# Patient Record
Sex: MALE | Race: WHITE | Employment: FULL TIME | URBAN - METROPOLITAN AREA
[De-identification: names, ages, dates, MRNs, and addresses within clinical notes are randomized per-mention and may not be internally consistent; named-entity substitution may affect disease eponyms.]

---

## 2019-10-01 ENCOUNTER — APPOINTMENT (OUTPATIENT)
Dept: GENERAL RADIOLOGY | Age: 69
End: 2019-10-01
Attending: EMERGENCY MEDICINE
Payer: COMMERCIAL

## 2019-10-01 ENCOUNTER — HOSPITAL ENCOUNTER (EMERGENCY)
Age: 69
Discharge: HOME | End: 2019-10-01
Attending: EMERGENCY MEDICINE
Payer: COMMERCIAL

## 2019-10-01 VITALS
HEIGHT: 74 IN | HEART RATE: 68 BPM | SYSTOLIC BLOOD PRESSURE: 150 MMHG | OXYGEN SATURATION: 96 % | WEIGHT: 208.34 LBS | DIASTOLIC BLOOD PRESSURE: 86 MMHG | TEMPERATURE: 98.9 F | BODY MASS INDEX: 26.74 KG/M2 | RESPIRATION RATE: 16 BRPM

## 2019-10-01 DIAGNOSIS — R07.9 CHEST PAIN, UNSPECIFIED TYPE: Primary | ICD-10-CM

## 2019-10-01 LAB
ALBUMIN SERPL-MCNC: 4.1 G/DL (ref 3.5–5)
ALBUMIN/GLOB SERPL: 1.2 {RATIO} (ref 1.1–2.2)
ALP SERPL-CCNC: 58 U/L (ref 45–117)
ALT SERPL-CCNC: 22 U/L (ref 12–78)
ANION GAP SERPL CALC-SCNC: 5 MMOL/L (ref 5–15)
AST SERPL-CCNC: 16 U/L (ref 15–37)
BASOPHILS # BLD: 0 K/UL (ref 0–0.1)
BASOPHILS NFR BLD: 1 % (ref 0–1)
BILIRUB SERPL-MCNC: 1 MG/DL (ref 0.2–1)
BUN SERPL-MCNC: 11 MG/DL (ref 6–20)
BUN/CREAT SERPL: 9 (ref 12–20)
CALCIUM SERPL-MCNC: 8.9 MG/DL (ref 8.5–10.1)
CHLORIDE SERPL-SCNC: 101 MMOL/L (ref 97–108)
CK SERPL-CCNC: 75 U/L (ref 39–308)
CO2 SERPL-SCNC: 30 MMOL/L (ref 21–32)
CREAT SERPL-MCNC: 1.19 MG/DL (ref 0.7–1.3)
DIFFERENTIAL METHOD BLD: ABNORMAL
EOSINOPHIL # BLD: 0.1 K/UL (ref 0–0.4)
EOSINOPHIL NFR BLD: 2 % (ref 0–7)
ERYTHROCYTE [DISTWIDTH] IN BLOOD BY AUTOMATED COUNT: 12.5 % (ref 11.5–14.5)
GLOBULIN SER CALC-MCNC: 3.3 G/DL (ref 2–4)
GLUCOSE SERPL-MCNC: 104 MG/DL (ref 65–100)
HCT VFR BLD AUTO: 40.7 % (ref 36.6–50.3)
HGB BLD-MCNC: 14.1 G/DL (ref 12.1–17)
IMM GRANULOCYTES # BLD AUTO: 0 K/UL (ref 0–0.04)
IMM GRANULOCYTES NFR BLD AUTO: 0 % (ref 0–0.5)
LYMPHOCYTES # BLD: 1.1 K/UL (ref 0.8–3.5)
LYMPHOCYTES NFR BLD: 19 % (ref 12–49)
MCH RBC QN AUTO: 34.1 PG (ref 26–34)
MCHC RBC AUTO-ENTMCNC: 34.6 G/DL (ref 30–36.5)
MCV RBC AUTO: 98.3 FL (ref 80–99)
MONOCYTES # BLD: 0.5 K/UL (ref 0–1)
MONOCYTES NFR BLD: 8 % (ref 5–13)
NEUTS SEG # BLD: 4.1 K/UL (ref 1.8–8)
NEUTS SEG NFR BLD: 70 % (ref 32–75)
NRBC # BLD: 0 K/UL (ref 0–0.01)
NRBC BLD-RTO: 0 PER 100 WBC
PLATELET # BLD AUTO: 260 K/UL (ref 150–400)
PMV BLD AUTO: 10.8 FL (ref 8.9–12.9)
POTASSIUM SERPL-SCNC: 3.9 MMOL/L (ref 3.5–5.1)
PROT SERPL-MCNC: 7.4 G/DL (ref 6.4–8.2)
RBC # BLD AUTO: 4.14 M/UL (ref 4.1–5.7)
SODIUM SERPL-SCNC: 136 MMOL/L (ref 136–145)
TROPONIN I BLD-MCNC: <0.04 NG/ML (ref 0–0.08)
TROPONIN I SERPL-MCNC: <0.05 NG/ML
WBC # BLD AUTO: 5.9 K/UL (ref 4.1–11.1)

## 2019-10-01 PROCEDURE — 80053 COMPREHEN METABOLIC PANEL: CPT

## 2019-10-01 PROCEDURE — 85025 COMPLETE CBC W/AUTO DIFF WBC: CPT

## 2019-10-01 PROCEDURE — 74011000250 HC RX REV CODE- 250: Performed by: EMERGENCY MEDICINE

## 2019-10-01 PROCEDURE — 82550 ASSAY OF CK (CPK): CPT

## 2019-10-01 PROCEDURE — 71045 X-RAY EXAM CHEST 1 VIEW: CPT

## 2019-10-01 PROCEDURE — 99284 EMERGENCY DEPT VISIT MOD MDM: CPT

## 2019-10-01 PROCEDURE — 84484 ASSAY OF TROPONIN QUANT: CPT

## 2019-10-01 PROCEDURE — 36415 COLL VENOUS BLD VENIPUNCTURE: CPT

## 2019-10-01 PROCEDURE — 93005 ELECTROCARDIOGRAM TRACING: CPT

## 2019-10-01 PROCEDURE — 74011250637 HC RX REV CODE- 250/637: Performed by: EMERGENCY MEDICINE

## 2019-10-01 PROCEDURE — 99283 EMERGENCY DEPT VISIT LOW MDM: CPT

## 2019-10-01 RX ADMIN — LIDOCAINE HYDROCHLORIDE 40 ML: 20 SOLUTION ORAL; TOPICAL at 19:02

## 2019-10-01 NOTE — DISCHARGE INSTRUCTIONS

## 2019-10-01 NOTE — ED PROVIDER NOTES
EMERGENCY DEPARTMENT HISTORY AND PHYSICAL EXAM      Date: 10/1/2019  Patient Name: Ginger Camarillo. Please note that this dictation was completed with [employer], the computer voice recognition software. Quite often unanticipated grammatical, syntax, homophones, and other interpretive errors are inadvertently transcribed by the computer software. Please disregard these errors. Please excuse any errors that have escaped final proofreading. History of Presenting Illness     Chief Complaint   Patient presents with    Chest Pain     pt reports chest pain since yesterday, was better this morning and then pain returned       History Provided By: Patient    HPI: Ginger Camarillo., 71 y.o. male, cc of chest pain. Reports pain started yesterday in the meeting. Described as burning. Better with walking, worsened by laying flat. He tried some Pepto with minimal relief. This morning his pain was gone, but returned at noon. Same pain as before, described as burning in his chest.  Worse with nothing, relieved by nothing. No associated shortness of breath, nausea, vomiting, diaphoresis. PCP: Alan Diaz MD    No current facility-administered medications on file prior to encounter. Current Outpatient Medications on File Prior to Encounter   Medication Sig Dispense Refill    lisinopril (PRINIVIL, ZESTRIL) 20 mg tablet Take 20 mg by mouth daily.  aspirin delayed-release 81 mg tablet Take  by mouth daily.  fish oil-omega-3 fatty acids 340-1,000 mg capsule Take 1 Cap by mouth daily. Past History     Past Medical History:  Past Medical History:   Diagnosis Date    Hypertension        Past Surgical History:  History reviewed. No pertinent surgical history. Family History:  History reviewed. No pertinent family history. Social History:  Social History     Tobacco Use    Smoking status: Never Smoker    Smokeless tobacco: Never Used   Substance Use Topics    Alcohol use:  Yes Alcohol/week: 11.0 standard drinks     Types: 4 Glasses of wine, 7 Cans of beer per week    Drug use: Not on file       Allergies:  No Known Allergies      Review of Systems   Review of Systems   Constitutional: Negative for chills and fever. HENT: Negative for congestion and sore throat. Eyes: Negative for visual disturbance. Respiratory: Negative for cough and shortness of breath. Cardiovascular: Positive for chest pain. Negative for leg swelling. Gastrointestinal: Negative for abdominal pain, blood in stool, diarrhea and nausea. Endocrine: Negative for polyuria. Genitourinary: Negative for dysuria and testicular pain. Musculoskeletal: Negative for arthralgias, joint swelling and myalgias. Skin: Negative for rash. Allergic/Immunologic: Negative for immunocompromised state. Neurological: Negative for weakness and headaches. Hematological: Does not bruise/bleed easily. Psychiatric/Behavioral: Negative for confusion. Physical Exam   Physical Exam   Constitutional: He is oriented to person, place, and time. He appears well-developed and well-nourished. HENT:   Head: Normocephalic and atraumatic. Moist mucous membranes   Eyes: Pupils are equal, round, and reactive to light. Conjunctivae are normal. Right eye exhibits no discharge. Left eye exhibits no discharge. Neck: Normal range of motion. Neck supple. No tracheal deviation present. Cardiovascular: Normal rate, regular rhythm and normal heart sounds. No murmur heard. Pulmonary/Chest: Effort normal and breath sounds normal. No respiratory distress. He has no wheezes. He has no rales. Abdominal: Soft. Bowel sounds are normal. There is no tenderness. There is no rebound and no guarding. Musculoskeletal: Normal range of motion. He exhibits no edema, tenderness or deformity. Neurological: He is alert and oriented to person, place, and time. Skin: Skin is warm and dry. No rash noted. No erythema.    Psychiatric: His behavior is normal.   Nursing note and vitals reviewed. Diagnostic Study Results     Labs -     No results found for this or any previous visit (from the past 12 hour(s)). Radiologic Studies -   XR CHEST PORT   Final Result   IMPRESSION: No acute cardiopulmonary disease. CT Results  (Last 48 hours)    None        CXR Results  (Last 48 hours)               10/01/19 1813  XR CHEST PORT Final result    Impression:  IMPRESSION: No acute cardiopulmonary disease. Narrative:  INDICATION: Chest pain. Portable AP upright view of the chest.       Direct comparison made to prior chest x-ray dated September 2016. Cardiomediastinal silhouette is stable. Lungs are clear bilaterally. Pleural   spaces are normal. Osseous structures are intact. Medical Decision Making   I am the first provider for this patient. I reviewed the vital signs, available nursing notes, past medical history, past surgical history, family history and social history. Vital Signs-Reviewed the patient's vital signs. No data found. Pulse Oximetry Analysis -99% on room air        EKG interpretation: (Preliminary)  EKG shows sinus rhythm, rate 76. Leftward axis. No evidence of acute ischemic ST or T wave changes. Interpreted by me. Records Reviewed: Nursing Notes and Old Medical Records    Provider Notes (Medical Decision Making):   Patient presents with CP. DDx:  ACS, Aortic dissection, PNA, PE, PTX, pericarditis, myocarditis, GERD, costochondritis, anxiety. Most likely noncardiac given the HPI and Physical exam. Will obtain labs, CXR, EKG. - I have re-examined the patient and the patient denies chest pain on re-examination. The patient has had onset of chest pain greater than 8 hours and one negative set of cardiac enzymes or 2 negative sets of cardiac enzymes in the ER during this visit.   The diagnosis, follow up, return instructions, test results, x-rays and medications have been discussed and reviewed with the patient. The patient has been given the opportunity to ask questions. The patient  expresses understanding of the diagnosis, follow-up and return instructions. The patient agrees to follow up with primary care or cardiology as directed and to return immediately if the chest pain worsens. The patient expresses understanding that although cardiac testing at this time is negative, a cardiac problem could still be present and that a follow-up appointment for further evaluation and risk factor modification is necessary to complete the evaluation of this complaint. ED Course:   Initial assessment performed. The patients presenting problems have been discussed, and they are in agreement with the care plan formulated and outlined with them. I have encouraged them to ask questions as they arise throughout their visit. Critical Care Time:   none    Disposition:  DISCHARGE NOTE  Patients results have been reviewed with them. Patient and/or family have verbally conveyed their understanding and agreement of the patient's signs, symptoms, diagnosis, treatment and prognosis and additionally agree to follow up as recommended or return to the Emergency Room should their condition change or have any new concerns prior to their follow-up appointment. Patient verbally agrees with the care-plan and verbally conveys that all of their questions have been answered. Discharge instructions have also been provided to the patient with some educational information regarding their diagnosis as well a list of reasons why they would want to return to the ER prior to their follow-up appointment should their condition change. PLAN:  1. Discharge Medication List as of 10/1/2019  7:13 PM        2.    Follow-up Information     Follow up With Specialties Details Why Contact Info    Alexandra Nick MD Family Practice Schedule an appointment as soon as possible for a visit  [address] [place] [address] [phone]  [phone]      [facility] EMERGENCY DEPT Emergency Medicine  If symptoms worsen [address] [place] [address]  [phone]          Return to ED if worse     Diagnosis     Clinical Impression:   1. Chest pain, unspecified type        Attestations:   This note was completed by Hanna Hargrove DO

## 2019-10-01 NOTE — ED NOTES
Report received from [facility]. They advised of the patient's chief complaint, current status, orders completed (to include IV access/medications/radiology testing), outstanding orders that still need to be completed, and the treatment plan. Questions asked and answered prior to assumption of care.

## 2019-10-01 NOTE — ED NOTES
Patient states he started with pain in the center of the chest yesterday. States it feels like a burning in the center of the chest, states better with walking, no other associated symptoms.

## 2019-10-02 LAB
ATRIAL RATE: 76 BPM
CALCULATED P AXIS, ECG09: 52 DEGREES
CALCULATED R AXIS, ECG10: -10 DEGREES
CALCULATED T AXIS, ECG11: 32 DEGREES
DIAGNOSIS, 93000: NORMAL
P-R INTERVAL, ECG05: 164 MS
Q-T INTERVAL, ECG07: 388 MS
QRS DURATION, ECG06: 98 MS
QTC CALCULATION (BEZET), ECG08: 436 MS
VENTRICULAR RATE, ECG03: 76 BPM

## 2021-04-02 ENCOUNTER — TRANSCRIBE ORDER (OUTPATIENT)
Dept: REGISTRATION | Age: 71
End: 2021-04-02

## 2021-04-02 ENCOUNTER — HOSPITAL ENCOUNTER (OUTPATIENT)
Dept: NON INVASIVE DIAGNOSTICS | Age: 71
Discharge: HOME | End: 2021-04-02
Payer: COMMERCIAL

## 2021-04-02 DIAGNOSIS — Z41.9 ELECTIVE SURGERY: Primary | ICD-10-CM

## 2021-04-02 DIAGNOSIS — Z41.9 ELECTIVE SURGERY: ICD-10-CM

## 2021-04-02 LAB
ATRIAL RATE: 68 BPM
CALCULATED P AXIS, ECG09: 71 DEGREES
CALCULATED R AXIS, ECG10: 0 DEGREES
CALCULATED T AXIS, ECG11: 33 DEGREES
DIAGNOSIS, 93000: NORMAL
P-R INTERVAL, ECG05: 174 MS
Q-T INTERVAL, ECG07: 386 MS
QRS DURATION, ECG06: 106 MS
QTC CALCULATION (BEZET), ECG08: 410 MS
VENTRICULAR RATE, ECG03: 68 BPM

## 2021-04-02 PROCEDURE — 93005 ELECTROCARDIOGRAM TRACING: CPT

## 2022-12-08 ENCOUNTER — TRANSCRIBE ORDER (OUTPATIENT)
Dept: SCHEDULING | Age: 72
End: 2022-12-08

## 2022-12-08 DIAGNOSIS — D47.2 MONOCLONAL GAMMOPATHIES: ICD-10-CM

## 2022-12-08 DIAGNOSIS — R23.3 SPONTANEOUS ECCHYMOSES: Primary | ICD-10-CM

## 2022-12-12 ENCOUNTER — HOSPITAL ENCOUNTER (OUTPATIENT)
Dept: GENERAL RADIOLOGY | Age: 72
Discharge: HOME | End: 2022-12-12
Attending: INTERNAL MEDICINE
Payer: COMMERCIAL

## 2022-12-12 DIAGNOSIS — R23.3 SPONTANEOUS ECCHYMOSES: ICD-10-CM

## 2022-12-12 DIAGNOSIS — D47.2 MONOCLONAL GAMMOPATHIES: ICD-10-CM

## 2022-12-12 PROCEDURE — 77075 RADEX OSSEOUS SURVEY COMPL: CPT

## 2023-10-20 ENCOUNTER — TELEPHONE (OUTPATIENT)
Age: 73
End: 2023-10-20

## 2023-10-20 NOTE — TELEPHONE ENCOUNTER
Returned call to wife Buddie Favorite, verified with two pt identifiers, advised of sooner availability for appt. Scheduled appt for 10/31/23 at 8 am with . she verbalized understanding and thanked me for the call.

## 2023-10-20 NOTE — TELEPHONE ENCOUNTER
Called and left message for pt to call to reschedule his appt. We are going to move it up to an earlier appt.

## 2023-10-20 NOTE — TELEPHONE ENCOUNTER
Patients wife returning your call regarding a sooner apptmt with Dr. Emily Casey.  Please call [phone]

## 2023-10-26 NOTE — PROGRESS NOTES
Neurology Note    Patient ID:  Irene Elam  [id number]  68 y.o.  1950      Date of Consultation:  October 31, 2023    Referring Physician: Dr. Octavia Mast    Reason for Consultation:  memory loss      Assessment and Plan:    The patient is a pleasant 77-year-old with a 1+ year history of of tremor and cognitive concern. His examination is notable for parkinsonism and mild cognitive impairment. Resting tremor, masked facies, stooped posture:  Symptoms are consistent with Parkinson's disease  Differential includes idiopathic versus secondary parkinsonism  I will order a brain MRI to evaluate for structural abnormalities  I did discuss medication with him including carbidopa/levodopa and dopamine agonist.  He is not interested in starting medication at this time as he would like to look into nonpharmacological treatments first.    We did discuss at length the diagnosis of Parkinson's disease and things that the patient could be doing to help slow the disease process. We did talk about the importance of exercise and movement and how this can help improve quality of life in patients with Parkinson's disease. We also did discuss the importance of healthy diet and diets similar to a Mediterranean diet can potentially have it in the impact in a positive way of Parkinson's disease. I did provide him with information on this diet    He will keep me updated on how his symptoms progress and if there is worsening and the need for medication. Memory loss:   The patient has noticed slow progressive decline in cognitive impairment  Differential includes early onset Alzheimer's, Parkinson's disease/Alzheimer disease complex  Brain MRI ordered  The patient will follow-up with his primary care doctor to ensure that his thyroid and vitamin B12 level were checked  I will have the patient be scheduled for neuropsychological testing to determine the more detailed etiology of the cognitive impairment and best strategies to

## 2023-10-31 ENCOUNTER — OFFICE VISIT (OUTPATIENT)
Age: 73
End: 2023-10-31
Payer: COMMERCIAL

## 2023-10-31 VITALS
SYSTOLIC BLOOD PRESSURE: 132 MMHG | HEIGHT: 74 IN | DIASTOLIC BLOOD PRESSURE: 84 MMHG | OXYGEN SATURATION: 100 % | WEIGHT: 210.4 LBS | TEMPERATURE: 97.5 F | HEART RATE: 67 BPM | RESPIRATION RATE: 18 BRPM | BODY MASS INDEX: 27 KG/M2

## 2023-10-31 DIAGNOSIS — G20.A1 PARKINSON'S DISEASE WITHOUT DYSKINESIA, UNSPECIFIED WHETHER MANIFESTATIONS FLUCTUATE: Primary | ICD-10-CM

## 2023-10-31 DIAGNOSIS — G31.84 MCI (MILD COGNITIVE IMPAIRMENT): ICD-10-CM

## 2023-10-31 DIAGNOSIS — R41.3 MEMORY LOSS: ICD-10-CM

## 2023-10-31 DIAGNOSIS — R25.1 TREMOR: ICD-10-CM

## 2023-10-31 PROCEDURE — 99205 OFFICE O/P NEW HI 60 MIN: CPT | Performed by: PSYCHIATRY & NEUROLOGY

## 2023-10-31 PROCEDURE — 1123F ACP DISCUSS/DSCN MKR DOCD: CPT | Performed by: PSYCHIATRY & NEUROLOGY

## 2023-10-31 RX ORDER — MIRTAZAPINE 7.5 MG/1
7.5 TABLET, FILM COATED ORAL NIGHTLY
COMMUNITY
Start: 2022-06-27

## 2023-10-31 RX ORDER — TELMISARTAN AND HYDROCHLORTHIAZIDE 80; 12.5 MG/1; MG/1
92.5 TABLET ORAL NIGHTLY
COMMUNITY
Start: 2022-06-03

## 2023-10-31 RX ORDER — SILODOSIN 8 MG/1
8 CAPSULE ORAL NIGHTLY
COMMUNITY
Start: 2022-06-26

## 2023-10-31 RX ORDER — AMOXICILLIN 250 MG
1 CAPSULE ORAL PRN
COMMUNITY

## 2023-10-31 RX ORDER — ESOMEPRAZOLE MAGNESIUM 40 MG/1
40 CAPSULE, DELAYED RELEASE ORAL DAILY
COMMUNITY
Start: 2022-07-19

## 2023-10-31 RX ORDER — DUTASTERIDE 0.5 MG/1
0.5 CAPSULE, LIQUID FILLED ORAL DAILY
COMMUNITY

## 2023-10-31 ASSESSMENT — PATIENT HEALTH QUESTIONNAIRE - PHQ9
SUM OF ALL RESPONSES TO PHQ9 QUESTIONS 1 & 2: 0
SUM OF ALL RESPONSES TO PHQ QUESTIONS 1-9: 0
2. FEELING DOWN, DEPRESSED OR HOPELESS: 0
1. LITTLE INTEREST OR PLEASURE IN DOING THINGS: 0

## 2023-11-09 ENCOUNTER — HOSPITAL ENCOUNTER (OUTPATIENT)
Facility: HOSPITAL | Age: 73
Discharge: HOME | End: 2023-11-09
Attending: PSYCHIATRY & NEUROLOGY
Payer: COMMERCIAL

## 2023-11-09 DIAGNOSIS — R41.3 MEMORY LOSS: ICD-10-CM

## 2023-11-09 DIAGNOSIS — G20.A1 PARKINSON'S DISEASE WITHOUT DYSKINESIA, UNSPECIFIED WHETHER MANIFESTATIONS FLUCTUATE: ICD-10-CM

## 2023-11-09 PROCEDURE — 70551 MRI BRAIN STEM W/O DYE: CPT

## 2023-11-17 ENCOUNTER — TELEPHONE (OUTPATIENT)
Age: 73
End: 2023-11-17

## 2023-11-17 NOTE — TELEPHONE ENCOUNTER
Patient request to be placed on the wait list with Dr. Villagomez. Please call him at [phone]

## 2024-02-16 ENCOUNTER — HOSPITAL ENCOUNTER (OUTPATIENT)
Facility: HOSPITAL | Age: 74
Discharge: HOME | End: 2024-02-16
Attending: INTERNAL MEDICINE
Payer: COMMERCIAL

## 2024-02-16 DIAGNOSIS — D47.2 MONOCLONAL PARAPROTEINEMIA: ICD-10-CM

## 2024-02-16 DIAGNOSIS — R23.3: ICD-10-CM

## 2024-02-16 PROCEDURE — 77075 RADEX OSSEOUS SURVEY COMPL: CPT

## 2024-04-15 ENCOUNTER — OFFICE VISIT (OUTPATIENT)
Age: 74
End: 2024-04-15
Payer: COMMERCIAL

## 2024-04-15 DIAGNOSIS — G20.A1 PARKINSON'S DISEASE WITHOUT DYSKINESIA, UNSPECIFIED WHETHER MANIFESTATIONS FLUCTUATE (HCC): Primary | ICD-10-CM

## 2024-04-15 DIAGNOSIS — G31.84 MCI (MILD COGNITIVE IMPAIRMENT): ICD-10-CM

## 2024-04-15 PROCEDURE — 90791 PSYCH DIAGNOSTIC EVALUATION: CPT | Performed by: CLINICAL NEUROPSYCHOLOGIST

## 2024-04-15 PROCEDURE — 1123F ACP DISCUSS/DSCN MKR DOCD: CPT | Performed by: CLINICAL NEUROPSYCHOLOGIST

## 2024-04-15 NOTE — PROGRESS NOTES
Intake Note      Patient Name: Theron Sterling Jr.  YOB: 1950    Age: 74 y.o.  Date of Intake: 4/15/2024   Education: 16 Ethnicity White   Gender: Male Referring Provider: Dr. Chisholm     REASON FOR REFERRAL AND EVALUATION PROCEDURES:  Theron Sterling Jr.  was referred for evaluation by his Neurologist to assist in differential diagnosis and individualized treatment planning. he understood the rationale and procedures for evaluation, as well as the limits to confidentiality, and agreed to participate. he consented to have this report made available to his  treating providers through his  electronic medical records.   History Sources: Patient, Spouse, and Medical Record    HISTORY OF PRESENT ILLNESS:  The patient is a 74-year-old male with pertinent medical history noted for hypertension.  He was referred by Dr. Chisholm due to concerns regarding cognitive dysfunction that is occurring within the context of physical changes concerning for Parkinson's disease (e.g., bilateral upper extremity tremor, masked facies, and stooped posture).  The patient presented to the current clinical interview accompanied by his wife.  While he appeared capable of providing history, his wife assisted with providing specific details in clarifying his report.  The patient initially reported he has not noticed any significant changes in his cognitive functioning.  However, with more specific questioning, he acknowledged there are inefficiencies in retrieving episodic information as well as less rapid word finding.  His wife agreed with this report and stated there has been \"just a little bit of a change\" that has occurred in the past year.  She denied noticing acute fluctuations in the patient's mental status and both the patient and his wife reported he remains functionally independent in all ADLs and IADLs.    Pertaining to the patient's physical functioning, his wife started noticing the emergence of a right upper extremity tremor a

## 2024-04-16 ENCOUNTER — TELEPHONE (OUTPATIENT)
Age: 74
End: 2024-04-16

## 2024-04-16 NOTE — TELEPHONE ENCOUNTER
Contacted [employer], per Sonali BRUNSON no auth is needed for [id number], [id number], [id number], [id number], [id number].  REF #: [id number]  Timestamp: [id number]

## 2024-04-30 ENCOUNTER — PROCEDURE VISIT (OUTPATIENT)
Age: 74
End: 2024-04-30
Payer: COMMERCIAL

## 2024-04-30 DIAGNOSIS — G20.A1 PARKINSON'S DISEASE WITHOUT DYSKINESIA, UNSPECIFIED WHETHER MANIFESTATIONS FLUCTUATE (HCC): Primary | ICD-10-CM

## 2024-04-30 DIAGNOSIS — G31.84 MCI (MILD COGNITIVE IMPAIRMENT): ICD-10-CM

## 2024-04-30 PROCEDURE — 96132 NRPSYC TST EVAL PHYS/QHP 1ST: CPT | Performed by: CLINICAL NEUROPSYCHOLOGIST

## 2024-04-30 PROCEDURE — 96138 PSYCL/NRPSYC TECH 1ST: CPT | Performed by: CLINICAL NEUROPSYCHOLOGIST

## 2024-04-30 PROCEDURE — 96139 PSYCL/NRPSYC TST TECH EA: CPT | Performed by: CLINICAL NEUROPSYCHOLOGIST

## 2024-04-30 PROCEDURE — 96133 NRPSYC TST EVAL PHYS/QHP EA: CPT | Performed by: CLINICAL NEUROPSYCHOLOGIST

## 2024-05-15 ENCOUNTER — OFFICE VISIT (OUTPATIENT)
Age: 74
End: 2024-05-15
Payer: COMMERCIAL

## 2024-05-15 DIAGNOSIS — G20.A1 PARKINSON'S DISEASE WITHOUT DYSKINESIA, UNSPECIFIED WHETHER MANIFESTATIONS FLUCTUATE (HCC): ICD-10-CM

## 2024-05-15 DIAGNOSIS — G31.84 MCI (MILD COGNITIVE IMPAIRMENT): Primary | ICD-10-CM

## 2024-05-15 PROCEDURE — 96132 NRPSYC TST EVAL PHYS/QHP 1ST: CPT | Performed by: CLINICAL NEUROPSYCHOLOGIST

## 2024-05-15 NOTE — PROGRESS NOTES
Prior to seeing the patient for today's visit, I reviewed pertinent records, including the previously completed report, the records in Epic, and any updated visits from other providers since the patient's last visit.    I provided feedback services related to the previously completed report. Attendees included: Patient, Spouse, and Children. Education was provided regarding my diagnostic impressions, and we discussed treatment plan/options. Attendees were provided with the opportunity to ask questions, which were answered to the best of my ability.    We discussed, in detail, the following:  Reviewed findings from evaluation including test results, diagnosis, and suspected contributing factors  Discussed recommendations outlined in report  Answered questions to the best of my ability    The patient needs to:   Follow up with referring provider for ongoing management, Complete driving evaluation, Use practical strategies to compensate for cognitive weaknesses (See handout), Emphasize modifiable risk and protective factors for cognitive functioning (e.g., exercise, diet, cognitive stimulation; see handout), Access community resources we discussed for additional support (See handout), and Follow up in 12 months    The patient had the following reactions to recommendations: Patient and family reported understanding results and recommendations.  They were interested in medication for cognitive functioning and physical symptoms and I deferred these questions to Dr. Chisholm.  They were open to follow-up and driving simulator so we will place that order and schedule for 12 months    MENTAL STATUS:  Appearance: Casually dressed, Well-groomed, and Appeared stated age  Orientation: Person, Place, Time, and Situation  Motor: Masked facies, reduced arm swing, hunched posture, and right upper extremity resting tremor  Thought Processes: Clear, Coherent, Logical, and Organized  Hearing and Vision: Adequate  Speech: Appropriate for

## 2024-06-18 ENCOUNTER — TELEPHONE (OUTPATIENT)
Age: 74
End: 2024-06-18

## 2024-06-18 NOTE — TELEPHONE ENCOUNTER
Patient called he would like to discuss his driving test results and when he can start back driving. Please call him at [phone]

## 2024-06-18 NOTE — TELEPHONE ENCOUNTER
Called and spoke with patient. Informed per provider he should follow the recommendations and restrictions provided to him by [facility]. Patient expressed understanding and thanked me for calling.

## 2024-09-11 ENCOUNTER — OFFICE VISIT (OUTPATIENT)
Age: 74
End: 2024-09-11
Payer: COMMERCIAL

## 2024-09-11 VITALS
RESPIRATION RATE: 15 BRPM | DIASTOLIC BLOOD PRESSURE: 84 MMHG | OXYGEN SATURATION: 98 % | BODY MASS INDEX: 24.38 KG/M2 | HEART RATE: 80 BPM | SYSTOLIC BLOOD PRESSURE: 124 MMHG | HEIGHT: 74 IN | WEIGHT: 190 LBS

## 2024-09-11 DIAGNOSIS — G31.84 MCI (MILD COGNITIVE IMPAIRMENT): ICD-10-CM

## 2024-09-11 DIAGNOSIS — G20.A1 PARKINSON'S DISEASE WITHOUT DYSKINESIA, UNSPECIFIED WHETHER MANIFESTATIONS FLUCTUATE (HCC): Primary | ICD-10-CM

## 2024-09-11 PROCEDURE — 1123F ACP DISCUSS/DSCN MKR DOCD: CPT | Performed by: PSYCHIATRY & NEUROLOGY

## 2024-09-11 PROCEDURE — 99214 OFFICE O/P EST MOD 30 MIN: CPT | Performed by: PSYCHIATRY & NEUROLOGY

## 2024-09-11 RX ORDER — PSEUDOEPHEDRINE HCL 30 MG
100 TABLET ORAL AS NEEDED
COMMUNITY
Start: 2022-05-04

## 2024-09-11 RX ORDER — CARBIDOPA AND LEVODOPA 25; 100 MG/1; MG/1
1 TABLET ORAL 2 TIMES DAILY
Qty: 180 TABLET | Refills: 3 | Status: SHIPPED | OUTPATIENT
Start: 2024-09-11

## 2024-09-11 ASSESSMENT — PATIENT HEALTH QUESTIONNAIRE - PHQ9
2. FEELING DOWN, DEPRESSED OR HOPELESS: NOT AT ALL
1. LITTLE INTEREST OR PLEASURE IN DOING THINGS: NOT AT ALL
SUM OF ALL RESPONSES TO PHQ QUESTIONS 1-9: 0
SUM OF ALL RESPONSES TO PHQ QUESTIONS 1-9: 0
SUM OF ALL RESPONSES TO PHQ9 QUESTIONS 1 & 2: 0
SUM OF ALL RESPONSES TO PHQ QUESTIONS 1-9: 0
SUM OF ALL RESPONSES TO PHQ QUESTIONS 1-9: 0

## 2025-04-21 ENCOUNTER — TELEPHONE (OUTPATIENT)
Age: 75
End: 2025-04-21

## 2025-04-21 ENCOUNTER — OFFICE VISIT (OUTPATIENT)
Age: 75
End: 2025-04-21
Payer: COMMERCIAL

## 2025-04-21 DIAGNOSIS — G31.84 MCI (MILD COGNITIVE IMPAIRMENT): ICD-10-CM

## 2025-04-21 DIAGNOSIS — G20.A1 PARKINSON'S DISEASE WITHOUT DYSKINESIA, UNSPECIFIED WHETHER MANIFESTATIONS FLUCTUATE (HCC): Primary | ICD-10-CM

## 2025-04-21 PROCEDURE — 1123F ACP DISCUSS/DSCN MKR DOCD: CPT | Performed by: CLINICAL NEUROPSYCHOLOGIST

## 2025-04-21 PROCEDURE — 90791 PSYCH DIAGNOSTIC EVALUATION: CPT | Performed by: CLINICAL NEUROPSYCHOLOGIST

## 2025-04-21 NOTE — TELEPHONE ENCOUNTER
Contacted [employer], per Aj CHUNG prior authorization is not required for procedure codes 42204, 34559, 89371, 53129, 32632.  Reference # [id number]

## 2025-04-21 NOTE — PROGRESS NOTES
Intake Note      Patient Name: Theron Sterling Jr.  YOB: 1950    Age: 75 y.o.  Date of Intake: 4/21/2025   Education: 16 Ethnicity White   Gender: Male Referring Provider: Dr. Chisholm     REASON FOR REFERRAL AND EVALUATION PROCEDURES:  Theron Sterling Jr.  was referred for evaluation by his Neurologist to assist in differential diagnosis and individualized treatment planning. he understood the rationale and procedures for evaluation, as well as the limits to confidentiality, and agreed to participate. he consented to have this report made available to his  treating providers through his  electronic medical records.   History Sources: Patient, Spouse, and Medical Record    HISTORY OF PRESENT ILLNESS:  The patient is a 75-year-old male with pertinent medical history noted for hypertension and Parkinson's disease.  He presented for serial neuropsychological evaluation following workup in our office in April 2024.  From my previous note:    The patient presented to the current clinical interview accompanied by his wife.  While he appeared capable of providing history, his wife assisted with providing specific details in clarifying his report.  The patient initially reported he has not noticed any significant changes in his cognitive functioning.  However, with more specific questioning, he acknowledged there are inefficiencies in retrieving episodic information as well as less rapid word finding.  His wife agreed with this report and stated there has been \"just a little bit of a change\" that has occurred in the past year.  She denied noticing acute fluctuations in the patient's mental status and both the patient and his wife reported he remains functionally independent in all ADLs and IADLs.     Pertaining to the patient's physical functioning, his wife started noticing the emergence of a right upper extremity tremor a few years ago that has worsened more so in the past year.  The patient also reported the

## 2025-05-05 ENCOUNTER — PROCEDURE VISIT (OUTPATIENT)
Age: 75
End: 2025-05-05
Payer: COMMERCIAL

## 2025-05-05 DIAGNOSIS — F02.A4 MILD DEMENTIA DUE TO PARKINSON'S DISEASE, WITH ANXIETY (HCC): Primary | ICD-10-CM

## 2025-05-05 DIAGNOSIS — G20.A1 MILD DEMENTIA DUE TO PARKINSON'S DISEASE, WITH ANXIETY (HCC): Primary | ICD-10-CM

## 2025-05-05 PROCEDURE — 96138 PSYCL/NRPSYC TECH 1ST: CPT | Performed by: CLINICAL NEUROPSYCHOLOGIST

## 2025-05-05 PROCEDURE — 96132 NRPSYC TST EVAL PHYS/QHP 1ST: CPT | Performed by: CLINICAL NEUROPSYCHOLOGIST

## 2025-05-05 PROCEDURE — 96139 PSYCL/NRPSYC TST TECH EA: CPT | Performed by: CLINICAL NEUROPSYCHOLOGIST

## 2025-05-05 PROCEDURE — 96133 NRPSYC TST EVAL PHYS/QHP EA: CPT | Performed by: CLINICAL NEUROPSYCHOLOGIST

## 2025-05-19 PROBLEM — G20.A1: Status: ACTIVE | Noted: 2025-05-19

## 2025-05-19 PROBLEM — F02.A4: Status: ACTIVE | Noted: 2025-05-19

## 2025-05-19 NOTE — PROGRESS NOTES
as qualitative observations of significant clinical change. A change was deemed to be statistically reliable if the z-score surpassed +/- 1.64 (90% confidence that change reflects a real change when accounting for error and practice effect). RCI data matching key demographic variables (e.g., age, ethnicity, gender, education, test-retest interval, etc.) may not be available and as such RCI is used more as a guide rather than a definitive indicator of change.    Premorbid Functioning:   Average (based on previous evaluation)  Global Cognitive Functioning (MoCA):  Visuospatial/executive: 1/5; earning point(s) for clock contour  Naming: 3/3  Memory (learning):  First trial: 3/5  Second trial: 4/5  Attention: 1/6; earning point(s) for 1/3 for serial seven subtraction  Language: 1/3; earning point(s) for first sentence repetition  Abstraction: 1/2; earning point(s) for methods of measurement  Memory (delayed recall):  Free recall: 2/5  Category cue: 2/3  Multiple-choice: 0/1  Orientation: 4/6; earning point(s) for month, year, day, and city  Total: 13/30 + 1 point(s) for education correction = 13/30  Attention:   Brief auditory attention: Average; unchanged  Auditory working memory:   Digit span backward: Low average; unchanged  Digit span sequencing: Exceptionally low; below the previous evaluation  Together, these performances are statistically below the previous evaluation  Processing Speed:  Below average to low average but generally in the low average range.  Largely unchanged.  Executive Functioning:   Mental set switching: Discontinued due to time limits.  During the allotted time (5 minutes), the patient successfully completed 13 out of 24 transitions.  He made three sequencing errors.  This is a similar performance to last year.  Problem Solving: Exceptionally low (completed zero categories).  This is clinically below the previous evaluation during which he completed four categories  Inhibition: Discontinued

## 2025-05-20 ENCOUNTER — OFFICE VISIT (OUTPATIENT)
Age: 75
End: 2025-05-20
Payer: COMMERCIAL

## 2025-05-20 DIAGNOSIS — G20.A1 MILD DEMENTIA DUE TO PARKINSON'S DISEASE, WITH ANXIETY (HCC): Primary | ICD-10-CM

## 2025-05-20 DIAGNOSIS — F02.A4 MILD DEMENTIA DUE TO PARKINSON'S DISEASE, WITH ANXIETY (HCC): Primary | ICD-10-CM

## 2025-05-20 PROCEDURE — 96132 NRPSYC TST EVAL PHYS/QHP 1ST: CPT | Performed by: CLINICAL NEUROPSYCHOLOGIST

## 2025-06-11 NOTE — PROGRESS NOTES
Neurology Note    Patient ID:  Theron Sterling Jr.  [id number]  75 y.o.  1950      Date of Consultation:  June 16, 2025    Assessment and Plan:    The patient is a pleasant male with parkinsonism and mild cognitive impairment who returns to the neurology clinic for follow up visit    Resting tremor, masked facies, stooped posture - parkinson's disease  progressive  Brain mri revealed chronic microvascular ischemic changes  Positive response to low dose sinemet.  Will increase to three times a day with a second pill at the mid day dosing.  Will continue with  25/100 mg tablets.   Discussed starting speech therapy.  He was not interested at this time.   We did discuss at length the diagnosis of Parkinson's disease and things that the patient could be doing to help slow the disease process.  We did talk about the importance of exercise and movement and how this can help improve quality of life in patients with Parkinson's disease.  We also did discuss the importance of healthy diet and diets similar to a Mediterranean diet can potentially have it in the impact in a positive way of Parkinson's disease.  I did provide him with information on this diet.  I did answer all the questions they had today in regards to research.  He will keep me updated on how his symptoms progress and if there is worsening and the need for medication adjustments.    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  Will start aricept 5 mg a day.  Side effects and toxicity were discussed  Discussed the importance of cognitively stimulating exercises daily  I discussed with the patient and family members in regards to the patient's cognitive limitations and need to ensure patient safety.  Attempts to minimize opportunities of harm is important.   Activities to be monitored, or avoided, would include cooking, ironing clothes, financial bill payments.   Having structure to a day is important for the patient.

## 2025-06-16 ENCOUNTER — OFFICE VISIT (OUTPATIENT)
Age: 75
End: 2025-06-16
Payer: COMMERCIAL

## 2025-06-16 VITALS
OXYGEN SATURATION: 97 % | DIASTOLIC BLOOD PRESSURE: 62 MMHG | HEIGHT: 74 IN | WEIGHT: 189 LBS | RESPIRATION RATE: 15 BRPM | SYSTOLIC BLOOD PRESSURE: 114 MMHG | BODY MASS INDEX: 24.26 KG/M2 | HEART RATE: 73 BPM

## 2025-06-16 DIAGNOSIS — F02.A4 MILD DEMENTIA DUE TO PARKINSON'S DISEASE, WITH ANXIETY (HCC): ICD-10-CM

## 2025-06-16 DIAGNOSIS — R41.3 MEMORY LOSS: ICD-10-CM

## 2025-06-16 DIAGNOSIS — G20.A1 PARKINSON'S DISEASE WITHOUT DYSKINESIA, UNSPECIFIED WHETHER MANIFESTATIONS FLUCTUATE (HCC): Primary | ICD-10-CM

## 2025-06-16 DIAGNOSIS — G20.A1 MILD DEMENTIA DUE TO PARKINSON'S DISEASE, WITH ANXIETY (HCC): ICD-10-CM

## 2025-06-16 PROCEDURE — 1123F ACP DISCUSS/DSCN MKR DOCD: CPT | Performed by: PSYCHIATRY & NEUROLOGY

## 2025-06-16 PROCEDURE — 99214 OFFICE O/P EST MOD 30 MIN: CPT | Performed by: PSYCHIATRY & NEUROLOGY

## 2025-06-16 RX ORDER — CARBIDOPA AND LEVODOPA 25; 100 MG/1; MG/1
1 TABLET ORAL 4 TIMES DAILY
Qty: 360 TABLET | Refills: 3 | Status: ON HOLD | OUTPATIENT
Start: 2025-06-16

## 2025-06-16 RX ORDER — DONEPEZIL HYDROCHLORIDE 5 MG/1
5 TABLET, FILM COATED ORAL NIGHTLY
Qty: 30 TABLET | Refills: 11 | Status: ON HOLD | OUTPATIENT
Start: 2025-06-16

## 2025-06-16 ASSESSMENT — PATIENT HEALTH QUESTIONNAIRE - PHQ9
SUM OF ALL RESPONSES TO PHQ QUESTIONS 1-9: 0
1. LITTLE INTEREST OR PLEASURE IN DOING THINGS: NOT AT ALL
2. FEELING DOWN, DEPRESSED OR HOPELESS: NOT AT ALL
SUM OF ALL RESPONSES TO PHQ QUESTIONS 1-9: 0

## 2025-06-16 NOTE — PROGRESS NOTES
1. Have you been to the ER, urgent care clinic since your last visit?  Hospitalized since your last visit?  No.    2. Have you seen or consulted any other health care providers outside of the [facility] since your last visit?  Include any pap smears or colon screening.   No.    Chief Complaint   Patient presents with    Parkinson's Disease      Pt denies any symptoms

## 2025-06-19 ENCOUNTER — ANESTHESIA EVENT (OUTPATIENT)
Facility: HOSPITAL | Age: 75
End: 2025-06-19
Payer: COMMERCIAL

## 2025-06-19 ENCOUNTER — ANESTHESIA (OUTPATIENT)
Facility: HOSPITAL | Age: 75
End: 2025-06-19
Payer: COMMERCIAL

## 2025-06-19 ENCOUNTER — APPOINTMENT (OUTPATIENT)
Facility: HOSPITAL | Age: 75
End: 2025-06-19
Payer: COMMERCIAL

## 2025-06-19 ENCOUNTER — HOSPITAL ENCOUNTER (INPATIENT)
Facility: HOSPITAL | Age: 75
LOS: 8 days | Discharge: HOME HEALTH | End: 2025-06-27
Attending: STUDENT IN AN ORGANIZED HEALTH CARE EDUCATION/TRAINING PROGRAM | Admitting: INTERNAL MEDICINE
Payer: COMMERCIAL

## 2025-06-19 DIAGNOSIS — E87.6 HYPOKALEMIA: ICD-10-CM

## 2025-06-19 DIAGNOSIS — K56.2 SIGMOID VOLVULUS (HCC): Primary | ICD-10-CM

## 2025-06-19 DIAGNOSIS — E87.1 HYPONATREMIA: ICD-10-CM

## 2025-06-19 LAB
ALBUMIN SERPL-MCNC: 4.2 G/DL (ref 3.5–5)
ALBUMIN/GLOB SERPL: 1.5 (ref 1.1–2.2)
ALP SERPL-CCNC: 70 U/L (ref 45–117)
ALT SERPL-CCNC: 10 U/L (ref 12–78)
ANION GAP SERPL CALC-SCNC: 7 MMOL/L (ref 2–12)
APPEARANCE UR: CLEAR
AST SERPL-CCNC: 16 U/L (ref 15–37)
BACTERIA URNS QL MICRO: NEGATIVE /HPF
BASOPHILS # BLD: 0.02 K/UL (ref 0–0.1)
BASOPHILS NFR BLD: 0.3 % (ref 0–1)
BILIRUB SERPL-MCNC: 0.9 MG/DL (ref 0.2–1)
BILIRUB UR QL: NEGATIVE
BUN SERPL-MCNC: 17 MG/DL (ref 6–20)
BUN/CREAT SERPL: 13 (ref 12–20)
CALCIUM SERPL-MCNC: 9.2 MG/DL (ref 8.5–10.1)
CHLORIDE SERPL-SCNC: 89 MMOL/L (ref 97–108)
CO2 SERPL-SCNC: 28 MMOL/L (ref 21–32)
COLOR UR: ABNORMAL
CREAT SERPL-MCNC: 1.26 MG/DL (ref 0.7–1.3)
DIFFERENTIAL METHOD BLD: ABNORMAL
EKG ATRIAL RATE: 79 BPM
EKG DIAGNOSIS: NORMAL
EKG P AXIS: 57 DEGREES
EKG P-R INTERVAL: 176 MS
EKG Q-T INTERVAL: 402 MS
EKG QRS DURATION: 102 MS
EKG QTC CALCULATION (BAZETT): 460 MS
EKG R AXIS: -10 DEGREES
EKG T AXIS: 28 DEGREES
EKG VENTRICULAR RATE: 79 BPM
EOSINOPHIL # BLD: 0.13 K/UL (ref 0–0.4)
EOSINOPHIL NFR BLD: 2.2 % (ref 0–7)
EPITH CASTS URNS QL MICRO: ABNORMAL /LPF
ERYTHROCYTE [DISTWIDTH] IN BLOOD BY AUTOMATED COUNT: 12.3 % (ref 11.5–14.5)
GLOBULIN SER CALC-MCNC: 2.8 G/DL (ref 2–4)
GLUCOSE SERPL-MCNC: 119 MG/DL (ref 65–100)
GLUCOSE UR STRIP.AUTO-MCNC: NEGATIVE MG/DL
HCT VFR BLD AUTO: 32.4 % (ref 36.6–50.3)
HGB BLD-MCNC: 11.4 G/DL (ref 12.1–17)
HGB UR QL STRIP: NEGATIVE
IMM GRANULOCYTES # BLD AUTO: 0.03 K/UL (ref 0–0.04)
IMM GRANULOCYTES NFR BLD AUTO: 0.5 % (ref 0–0.5)
KETONES UR QL STRIP.AUTO: 15 MG/DL
LEUKOCYTE ESTERASE UR QL STRIP.AUTO: NEGATIVE
LIPASE SERPL-CCNC: 19 U/L (ref 13–75)
LYMPHOCYTES # BLD: 0.5 K/UL (ref 0.8–3.5)
LYMPHOCYTES NFR BLD: 8.5 % (ref 12–49)
MAGNESIUM SERPL-MCNC: 1.4 MG/DL (ref 1.6–2.4)
MCH RBC QN AUTO: 32.5 PG (ref 26–34)
MCHC RBC AUTO-ENTMCNC: 35.2 G/DL (ref 30–36.5)
MCV RBC AUTO: 92.3 FL (ref 80–99)
MONOCYTES # BLD: 0.45 K/UL (ref 0–1)
MONOCYTES NFR BLD: 7.7 % (ref 5–13)
NEUTS SEG # BLD: 4.73 K/UL (ref 1.8–8)
NEUTS SEG NFR BLD: 80.8 % (ref 32–75)
NITRITE UR QL STRIP.AUTO: NEGATIVE
NRBC # BLD: 0 K/UL (ref 0–0.01)
NRBC BLD-RTO: 0 PER 100 WBC
PH UR STRIP: 6 (ref 5–8)
PLATELET # BLD AUTO: 226 K/UL (ref 150–400)
PMV BLD AUTO: 11.3 FL (ref 8.9–12.9)
POTASSIUM SERPL-SCNC: 2.7 MMOL/L (ref 3.5–5.1)
PROT SERPL-MCNC: 7 G/DL (ref 6.4–8.2)
PROT UR STRIP-MCNC: NEGATIVE MG/DL
RBC # BLD AUTO: 3.51 M/UL (ref 4.1–5.7)
RBC #/AREA URNS HPF: ABNORMAL /HPF (ref 0–5)
SODIUM SERPL-SCNC: 124 MMOL/L (ref 136–145)
SP GR UR REFRACTOMETRY: 1.01 (ref 1–1.03)
URINE CULTURE IF INDICATED: ABNORMAL
UROBILINOGEN UR QL STRIP.AUTO: 0.2 EU/DL (ref 0.2–1)
WBC # BLD AUTO: 5.9 K/UL (ref 4.1–11.1)
WBC URNS QL MICRO: ABNORMAL /HPF (ref 0–4)

## 2025-06-19 PROCEDURE — 2580000003 HC RX 258: Performed by: STUDENT IN AN ORGANIZED HEALTH CARE EDUCATION/TRAINING PROGRAM

## 2025-06-19 PROCEDURE — C1729 CATH, DRAINAGE: HCPCS | Performed by: INTERNAL MEDICINE

## 2025-06-19 PROCEDURE — 1100000000 HC RM PRIVATE

## 2025-06-19 PROCEDURE — 2580000003 HC RX 258: Performed by: INTERNAL MEDICINE

## 2025-06-19 PROCEDURE — C1889 IMPLANT/INSERT DEVICE, NOC: HCPCS | Performed by: INTERNAL MEDICINE

## 2025-06-19 PROCEDURE — 7100000011 HC PHASE II RECOVERY - ADDTL 15 MIN: Performed by: INTERNAL MEDICINE

## 2025-06-19 PROCEDURE — 71045 X-RAY EXAM CHEST 1 VIEW: CPT

## 2025-06-19 PROCEDURE — 3600007512: Performed by: INTERNAL MEDICINE

## 2025-06-19 PROCEDURE — 3600007502: Performed by: INTERNAL MEDICINE

## 2025-06-19 PROCEDURE — 85025 COMPLETE CBC W/AUTO DIFF WBC: CPT

## 2025-06-19 PROCEDURE — 3700000001 HC ADD 15 MINUTES (ANESTHESIA): Performed by: INTERNAL MEDICINE

## 2025-06-19 PROCEDURE — 0D9P80Z DRAINAGE OF RECTUM WITH DRAINAGE DEVICE, VIA NATURAL OR ARTIFICIAL OPENING ENDOSCOPIC: ICD-10-PCS | Performed by: INTERNAL MEDICINE

## 2025-06-19 PROCEDURE — 3700000000 HC ANESTHESIA ATTENDED CARE: Performed by: INTERNAL MEDICINE

## 2025-06-19 PROCEDURE — 99221 1ST HOSP IP/OBS SF/LOW 40: CPT

## 2025-06-19 PROCEDURE — 93005 ELECTROCARDIOGRAM TRACING: CPT | Performed by: STUDENT IN AN ORGANIZED HEALTH CARE EDUCATION/TRAINING PROGRAM

## 2025-06-19 PROCEDURE — 74177 CT ABD & PELVIS W/CONTRAST: CPT

## 2025-06-19 PROCEDURE — 2709999900 HC NON-CHARGEABLE SUPPLY: Performed by: INTERNAL MEDICINE

## 2025-06-19 PROCEDURE — 80053 COMPREHEN METABOLIC PANEL: CPT

## 2025-06-19 PROCEDURE — 6360000002 HC RX W HCPCS: Performed by: INTERNAL MEDICINE

## 2025-06-19 PROCEDURE — 6360000004 HC RX CONTRAST MEDICATION: Performed by: STUDENT IN AN ORGANIZED HEALTH CARE EDUCATION/TRAINING PROGRAM

## 2025-06-19 PROCEDURE — 81001 URINALYSIS AUTO W/SCOPE: CPT

## 2025-06-19 PROCEDURE — 6360000002 HC RX W HCPCS: Performed by: NURSE ANESTHETIST, CERTIFIED REGISTERED

## 2025-06-19 PROCEDURE — 96365 THER/PROPH/DIAG IV INF INIT: CPT

## 2025-06-19 PROCEDURE — 99285 EMERGENCY DEPT VISIT HI MDM: CPT

## 2025-06-19 PROCEDURE — 7100000010 HC PHASE II RECOVERY - FIRST 15 MIN: Performed by: INTERNAL MEDICINE

## 2025-06-19 PROCEDURE — 83735 ASSAY OF MAGNESIUM: CPT

## 2025-06-19 PROCEDURE — 83690 ASSAY OF LIPASE: CPT

## 2025-06-19 PROCEDURE — 6360000002 HC RX W HCPCS: Performed by: STUDENT IN AN ORGANIZED HEALTH CARE EDUCATION/TRAINING PROGRAM

## 2025-06-19 PROCEDURE — 96375 TX/PRO/DX INJ NEW DRUG ADDON: CPT

## 2025-06-19 DEVICE — WORKING LENGTH 235CM, WORKING CHANNEL 2.8MM
Type: IMPLANTABLE DEVICE | Site: TRANSVERSE COLON | Status: FUNCTIONAL
Brand: RESOLUTION 360 CLIP

## 2025-06-19 RX ORDER — POTASSIUM CHLORIDE 7.45 MG/ML
10 INJECTION INTRAVENOUS
Status: DISPENSED | OUTPATIENT
Start: 2025-06-19 | End: 2025-06-19

## 2025-06-19 RX ORDER — LIDOCAINE HYDROCHLORIDE 20 MG/ML
INJECTION, SOLUTION EPIDURAL; INFILTRATION; INTRACAUDAL; PERINEURAL
Status: DISCONTINUED | OUTPATIENT
Start: 2025-06-19 | End: 2025-06-19 | Stop reason: SDUPTHER

## 2025-06-19 RX ORDER — POTASSIUM CHLORIDE 7.45 MG/ML
10 INJECTION INTRAVENOUS
Status: COMPLETED | OUTPATIENT
Start: 2025-06-19 | End: 2025-06-19

## 2025-06-19 RX ORDER — POTASSIUM CHLORIDE 750 MG/1
40 TABLET, EXTENDED RELEASE ORAL PRN
Status: DISCONTINUED | OUTPATIENT
Start: 2025-06-19 | End: 2025-06-27 | Stop reason: HOSPADM

## 2025-06-19 RX ORDER — POTASSIUM CHLORIDE 7.45 MG/ML
10 INJECTION INTRAVENOUS PRN
Status: DISCONTINUED | OUTPATIENT
Start: 2025-06-19 | End: 2025-06-27 | Stop reason: HOSPADM

## 2025-06-19 RX ORDER — IOPAMIDOL 755 MG/ML
100 INJECTION, SOLUTION INTRAVASCULAR
Status: COMPLETED | OUTPATIENT
Start: 2025-06-19 | End: 2025-06-19

## 2025-06-19 RX ORDER — SODIUM CHLORIDE 9 MG/ML
INJECTION, SOLUTION INTRAVENOUS CONTINUOUS
Status: DISCONTINUED | OUTPATIENT
Start: 2025-06-19 | End: 2025-06-22

## 2025-06-19 RX ORDER — MAGNESIUM SULFATE IN WATER 40 MG/ML
2000 INJECTION, SOLUTION INTRAVENOUS PRN
Status: DISCONTINUED | OUTPATIENT
Start: 2025-06-19 | End: 2025-06-27 | Stop reason: HOSPADM

## 2025-06-19 RX ORDER — 0.9 % SODIUM CHLORIDE 0.9 %
1000 INTRAVENOUS SOLUTION INTRAVENOUS ONCE
Status: COMPLETED | OUTPATIENT
Start: 2025-06-19 | End: 2025-06-19

## 2025-06-19 RX ORDER — ACETAMINOPHEN 325 MG/1
650 TABLET ORAL EVERY 6 HOURS PRN
Status: DISCONTINUED | OUTPATIENT
Start: 2025-06-19 | End: 2025-06-23

## 2025-06-19 RX ORDER — ENOXAPARIN SODIUM 100 MG/ML
40 INJECTION SUBCUTANEOUS DAILY
Status: DISCONTINUED | OUTPATIENT
Start: 2025-06-19 | End: 2025-06-27 | Stop reason: HOSPADM

## 2025-06-19 RX ORDER — MORPHINE SULFATE 2 MG/ML
2 INJECTION, SOLUTION INTRAMUSCULAR; INTRAVENOUS EVERY 4 HOURS PRN
Status: DISCONTINUED | OUTPATIENT
Start: 2025-06-19 | End: 2025-06-27 | Stop reason: HOSPADM

## 2025-06-19 RX ORDER — SODIUM CHLORIDE 9 MG/ML
INJECTION, SOLUTION INTRAVENOUS PRN
Status: DISCONTINUED | OUTPATIENT
Start: 2025-06-19 | End: 2025-06-19 | Stop reason: HOSPADM

## 2025-06-19 RX ORDER — SODIUM CHLORIDE 9 MG/ML
INJECTION, SOLUTION INTRAVENOUS
Status: COMPLETED | OUTPATIENT
Start: 2025-06-19 | End: 2025-06-19

## 2025-06-19 RX ORDER — SODIUM CHLORIDE 0.9 % (FLUSH) 0.9 %
5-40 SYRINGE (ML) INJECTION EVERY 12 HOURS SCHEDULED
Status: DISCONTINUED | OUTPATIENT
Start: 2025-06-19 | End: 2025-06-27 | Stop reason: HOSPADM

## 2025-06-19 RX ORDER — SODIUM CHLORIDE 0.9 % (FLUSH) 0.9 %
5-40 SYRINGE (ML) INJECTION PRN
Status: DISCONTINUED | OUTPATIENT
Start: 2025-06-19 | End: 2025-06-19 | Stop reason: HOSPADM

## 2025-06-19 RX ORDER — SODIUM CHLORIDE 9 MG/ML
INJECTION, SOLUTION INTRAVENOUS CONTINUOUS
Status: DISCONTINUED | OUTPATIENT
Start: 2025-06-19 | End: 2025-06-19 | Stop reason: HOSPADM

## 2025-06-19 RX ORDER — SODIUM CHLORIDE 9 MG/ML
INJECTION, SOLUTION INTRAVENOUS PRN
Status: DISCONTINUED | OUTPATIENT
Start: 2025-06-19 | End: 2025-06-27 | Stop reason: HOSPADM

## 2025-06-19 RX ORDER — MAGNESIUM SULFATE IN WATER 40 MG/ML
2000 INJECTION, SOLUTION INTRAVENOUS ONCE
Status: COMPLETED | OUTPATIENT
Start: 2025-06-19 | End: 2025-06-19

## 2025-06-19 RX ORDER — SODIUM CHLORIDE 0.9 % (FLUSH) 0.9 %
5-40 SYRINGE (ML) INJECTION PRN
Status: DISCONTINUED | OUTPATIENT
Start: 2025-06-19 | End: 2025-06-27 | Stop reason: HOSPADM

## 2025-06-19 RX ORDER — SODIUM CHLORIDE 0.9 % (FLUSH) 0.9 %
5-40 SYRINGE (ML) INJECTION EVERY 12 HOURS SCHEDULED
Status: DISCONTINUED | OUTPATIENT
Start: 2025-06-19 | End: 2025-06-19 | Stop reason: HOSPADM

## 2025-06-19 RX ORDER — ACETAMINOPHEN 650 MG/1
650 SUPPOSITORY RECTAL EVERY 6 HOURS PRN
Status: DISCONTINUED | OUTPATIENT
Start: 2025-06-19 | End: 2025-06-23

## 2025-06-19 RX ORDER — POLYETHYLENE GLYCOL 3350 17 G/17G
17 POWDER, FOR SOLUTION ORAL DAILY PRN
Status: DISCONTINUED | OUTPATIENT
Start: 2025-06-19 | End: 2025-06-23

## 2025-06-19 RX ORDER — ONDANSETRON 2 MG/ML
4 INJECTION INTRAMUSCULAR; INTRAVENOUS EVERY 6 HOURS PRN
Status: DISCONTINUED | OUTPATIENT
Start: 2025-06-19 | End: 2025-06-24 | Stop reason: SDUPTHER

## 2025-06-19 RX ORDER — PHENYLEPHRINE HCL IN 0.9% NACL 0.4MG/10ML
SYRINGE (ML) INTRAVENOUS
Status: DISCONTINUED | OUTPATIENT
Start: 2025-06-19 | End: 2025-06-19 | Stop reason: SDUPTHER

## 2025-06-19 RX ORDER — ONDANSETRON 4 MG/1
4 TABLET, ORALLY DISINTEGRATING ORAL EVERY 8 HOURS PRN
Status: DISCONTINUED | OUTPATIENT
Start: 2025-06-19 | End: 2025-06-24 | Stop reason: SDUPTHER

## 2025-06-19 RX ADMIN — PROPOFOL 25 MG: 10 INJECTION, EMULSION INTRAVENOUS at 16:47

## 2025-06-19 RX ADMIN — POTASSIUM CHLORIDE 10 MEQ: 7.46 INJECTION, SOLUTION INTRAVENOUS at 14:14

## 2025-06-19 RX ADMIN — PROPOFOL 25 MG: 10 INJECTION, EMULSION INTRAVENOUS at 16:51

## 2025-06-19 RX ADMIN — SODIUM CHLORIDE: 0.9 INJECTION, SOLUTION INTRAVENOUS at 11:40

## 2025-06-19 RX ADMIN — PROPOFOL 25 MG: 10 INJECTION, EMULSION INTRAVENOUS at 17:17

## 2025-06-19 RX ADMIN — PROPOFOL 25 MG: 10 INJECTION, EMULSION INTRAVENOUS at 16:49

## 2025-06-19 RX ADMIN — PROPOFOL 25 MG: 10 INJECTION, EMULSION INTRAVENOUS at 16:56

## 2025-06-19 RX ADMIN — IOPAMIDOL 100 ML: 755 INJECTION, SOLUTION INTRAVENOUS at 11:16

## 2025-06-19 RX ADMIN — PROPOFOL 25 MG: 10 INJECTION, EMULSION INTRAVENOUS at 16:45

## 2025-06-19 RX ADMIN — PROPOFOL 50 MG: 10 INJECTION, EMULSION INTRAVENOUS at 16:44

## 2025-06-19 RX ADMIN — MAGNESIUM SULFATE HEPTAHYDRATE 2000 MG: 40 INJECTION, SOLUTION INTRAVENOUS at 11:41

## 2025-06-19 RX ADMIN — PROPOFOL 25 MG: 10 INJECTION, EMULSION INTRAVENOUS at 17:04

## 2025-06-19 RX ADMIN — PROPOFOL 25 MG: 10 INJECTION, EMULSION INTRAVENOUS at 17:06

## 2025-06-19 RX ADMIN — PROPOFOL 25 MG: 10 INJECTION, EMULSION INTRAVENOUS at 17:12

## 2025-06-19 RX ADMIN — Medication 80 MCG: at 17:16

## 2025-06-19 RX ADMIN — SODIUM CHLORIDE: 9 INJECTION, SOLUTION INTRAVENOUS at 16:15

## 2025-06-19 RX ADMIN — PROPOFOL 25 MG: 10 INJECTION, EMULSION INTRAVENOUS at 17:01

## 2025-06-19 RX ADMIN — PROPOFOL 25 MG: 10 INJECTION, EMULSION INTRAVENOUS at 16:54

## 2025-06-19 RX ADMIN — PROPOFOL 25 MG: 10 INJECTION, EMULSION INTRAVENOUS at 17:08

## 2025-06-19 RX ADMIN — PROPOFOL 25 MG: 10 INJECTION, EMULSION INTRAVENOUS at 17:15

## 2025-06-19 RX ADMIN — Medication 80 MCG: at 17:08

## 2025-06-19 RX ADMIN — POTASSIUM CHLORIDE 10 MEQ: 7.46 INJECTION, SOLUTION INTRAVENOUS at 11:39

## 2025-06-19 RX ADMIN — POTASSIUM CHLORIDE 10 MEQ: 7.46 INJECTION, SOLUTION INTRAVENOUS at 12:38

## 2025-06-19 RX ADMIN — SODIUM CHLORIDE 1000 ML: 0.9 INJECTION, SOLUTION INTRAVENOUS at 11:41

## 2025-06-19 RX ADMIN — POTASSIUM CHLORIDE 10 MEQ: 7.46 INJECTION, SOLUTION INTRAVENOUS at 16:12

## 2025-06-19 RX ADMIN — Medication 120 MCG: at 17:05

## 2025-06-19 RX ADMIN — PROPOFOL 25 MG: 10 INJECTION, EMULSION INTRAVENOUS at 16:58

## 2025-06-19 RX ADMIN — PROPOFOL 25 MG: 10 INJECTION, EMULSION INTRAVENOUS at 17:13

## 2025-06-19 RX ADMIN — PROPOFOL 25 MG: 10 INJECTION, EMULSION INTRAVENOUS at 17:10

## 2025-06-19 RX ADMIN — LIDOCAINE HYDROCHLORIDE 40 MG: 20 INJECTION, SOLUTION EPIDURAL; INFILTRATION; INTRACAUDAL; PERINEURAL at 16:44

## 2025-06-19 ASSESSMENT — PAIN SCALES - GENERAL
PAINLEVEL_OUTOF10: 8
PAINLEVEL_OUTOF10: 0
PAINLEVEL_OUTOF10: 8
PAINLEVEL_OUTOF10: 5

## 2025-06-19 ASSESSMENT — PAIN - FUNCTIONAL ASSESSMENT
PAIN_FUNCTIONAL_ASSESSMENT: 0-10
PAIN_FUNCTIONAL_ASSESSMENT: ACTIVITIES ARE NOT PREVENTED
PAIN_FUNCTIONAL_ASSESSMENT: 0-10

## 2025-06-19 ASSESSMENT — LIFESTYLE VARIABLES
HOW MANY STANDARD DRINKS CONTAINING ALCOHOL DO YOU HAVE ON A TYPICAL DAY: PATIENT DOES NOT DRINK
HOW OFTEN DO YOU HAVE A DRINK CONTAINING ALCOHOL: NEVER

## 2025-06-19 ASSESSMENT — PAIN DESCRIPTION - LOCATION
LOCATION: ABDOMEN
LOCATION: BACK
LOCATION: ABDOMEN

## 2025-06-19 ASSESSMENT — PAIN DESCRIPTION - FREQUENCY: FREQUENCY: INTERMITTENT

## 2025-06-19 ASSESSMENT — PAIN DESCRIPTION - ORIENTATION
ORIENTATION: LOWER
ORIENTATION: MID

## 2025-06-19 ASSESSMENT — PAIN DESCRIPTION - PAIN TYPE
TYPE: CHRONIC PAIN
TYPE: ACUTE PAIN

## 2025-06-19 ASSESSMENT — PAIN DESCRIPTION - DESCRIPTORS
DESCRIPTORS: ACHING
DESCRIPTORS: TIGHTNESS
DESCRIPTORS: ACHING

## 2025-06-19 NOTE — CONSULTS
[place] [id number]       GASTROENTEROLOGY CONSULTATION NOTE        NAME:  Theron Sterling Jr.   :   1950   MRN:   [id number]           Consult Date: 2025 2:35 PM      History of Present Illness:  Patient is a 75 y.o. who is seen in consultation at the request of Dr. Trevizo for sigmoid volvulus. He has a PMH Parkinson disease, hypertension and GERD. He [facility] with abdominal discomfort and loose stools over the past few days. He reports abdominal pain that is generalized and abdominal distention.   In ED, CT showed sigmoid volvulus with counter clockwise rotation and rectal wall thickening. He has been transferred to [facility] ED. Hospitalist to admit.    Last colonoscopy with Dr. Lerner in  with single 3 mm rectal polyp, moderate pan-diverticulosis. Last EGD by Dr. Arceo in  - hiatal hernia, otherwise normal.       PMH:  Past Medical History:   Diagnosis Date    Chronic back pain     GERD (gastroesophageal reflux disease)     Hypertension     Parkinson's disease (HCC)     Tremor Don’t know       PSH:  Past Surgical History:   Procedure Laterality Date    UROLOGICAL SURGERY      \"routing surgery\"       Allergies:  No Known Allergies    Home Medications:  Prior to Admission Medications   Prescriptions Last Dose Informant Patient Reported? Taking?   carbidopa-levodopa (SINEMET)  MG per tablet 2025 Morning  No Yes   Sig: Take 1 tablet by mouth 4 times daily   docusate (COLACE, DULCOLAX) 100 MG CAPS 2025  Yes Yes   Sig: Take 100 mg by mouth as needed   donepezil (ARICEPT) 5 MG tablet Unknown  No No   Sig: Take 1 tablet by mouth nightly   dutasteride (AVODART) 0.5 MG capsule 2025 Morning  Yes Yes   Sig: Take 1 capsule by mouth daily   esomeprazole (NEXIUM) 40 MG delayed release capsule 2025 Morning  Yes Yes   Sig: Take 1 capsule by mouth daily   mirtazapine (REMERON) 7.5 MG tablet 2025 Morning  Yes Yes   Sig:  feelings of anxiety, depression     Objective:   Patient Vitals for the past 8 hrs:   BP Temp Temp src Pulse Resp SpO2 Weight   06/19/25 1407 (!) 143/72 98.5 °F (36.9 °C) Oral 83 16 97 % --   06/19/25 1325 -- 97.4 °F (36.3 °C) Tympanic -- -- -- --   06/19/25 1300 (!) 151/73 -- -- 80 13 99 % --   06/19/25 1230 (!) 147/68 -- -- 69 14 98 % --   06/19/25 1200 (!) 153/75 -- -- 71 15 98 % --   06/19/25 1100 (!) 140/73 -- -- -- -- 100 % --   06/19/25 1015 131/71 -- -- -- -- 97 % --   06/19/25 1009 122/77 97.3 °F (36.3 °C) Tympanic 69 16 99 % 85.3 kg (188 lb 0.8 oz)     06/19 0701 - 06/19 1900  In: 1000   Out: -   No intake/output data recorded.    PHYSICAL EXAM:  General: WD, WN. Alert, cooperative, no acute distress    HEENT: NC, Atraumatic.  PERRLA, EOMI. Anicteric sclerae.    Abdomen: Soft, distended, mild TTP  +Bowel sounds, no HSM  Extremities: No c/c/e  Neurologic:  CN 2-12 gi, Alert and oriented X 3.  No acute neurological distress   Psych:   Good insight. Not anxious nor agitated.     Data Review     Recent Labs     06/19/25  1030   WBC 5.9   HGB 11.4*   HCT 32.4*        Recent Labs     06/19/25  1030   *   K 2.7*   CL 89*   CO2 28   BUN 17     Recent Labs     06/19/25  1030   GLOB 2.8     No results for input(s): \"INR\", \"APTT\" in the last 72 hours.    Invalid input(s): \"PTP\"    Radiology Review    As above       Assessment:     Sigmoid volvulus  hypokalemia     Plan:     NPO  Flexible sigmoidoscopy today. Discussed procedure details and risks with patient and he is in agreement  Anesthesia team to review his EKG in setting of hypokalemia     Signed By: Sotero Florian MD     6/19/2025  2:35 PM

## 2025-06-19 NOTE — ED PROVIDER NOTES
by mouth as needed    DONEPEZIL (ARICEPT) 5 MG TABLET    Take 1 tablet by mouth nightly    DUTASTERIDE (AVODART) 0.5 MG CAPSULE    Take 1 capsule by mouth daily    ESOMEPRAZOLE (NEXIUM) 40 MG DELAYED RELEASE CAPSULE    Take 1 capsule by mouth daily    MIRTAZAPINE (REMERON) 7.5 MG TABLET    Take 1 tablet by mouth at bedtime    TELMISARTAN-HYDROCHLOROTHIAZIDE (MICARDIS HCT) 80-12.5 MG PER TABLET    Take by mouth in the morning and at bedtime       ALLERGIES     Patient has no known allergies.    FAMILY HISTORY       Family History   Problem Relation Age of Onset    Alzheimer's Disease Mother           SOCIAL HISTORY       Social History     Socioeconomic History    Marital status:      Spouse name: None    Number of children: None    Years of education: None    Highest education level: None   Tobacco Use    Smoking status: Never    Smokeless tobacco: Never   Vaping Use    Vaping status: Never Used   Substance and Sexual Activity    Alcohol use: Not Currently     Alcohol/week: 14.0 standard drinks of alcohol    Drug use: Never           PHYSICAL EXAM    (up to 7 for level 4, 8 or more for level 5)     ED Triage Vitals [06/19/25 1009]   BP Systolic BP Percentile Diastolic BP Percentile Temp Temp Source Pulse Respirations SpO2   122/77 -- -- 97.3 °F (36.3 °C) Tympanic 69 16 99 %      Height Weight - Scale         -- 85.3 kg (188 lb 0.8 oz)             Body mass index is 24.14 kg/m².    Physical Exam  Constitutional:       Appearance: Normal appearance.   HENT:      Head: Normocephalic and atraumatic.      Nose: Nose normal.      Mouth/Throat:      Mouth: Mucous membranes are moist.   Cardiovascular:      Rate and Rhythm: Normal rate.      Heart sounds: No murmur heard.  Pulmonary:      Effort: Pulmonary effort is normal. No respiratory distress.      Breath sounds: Normal breath sounds.   Abdominal:      Palpations: Abdomen is soft.      Tenderness: There is no abdominal tenderness.      Comments: Palpable

## 2025-06-19 NOTE — PROGRESS NOTES
TRANSFER - IN REPORT:    Verbal report received from Edouard on Theron Sterling Jr.  being received from ER26 for ordered procedure      Report consisted of patient's Situation, Background, Assessment and   Recommendations(SBAR).     Information from the following report(s) Nurse Handoff Report was reviewed with the receiving nurse.    Opportunity for questions and clarification was provided.      Assessment completed upon patient's arrival to unit and care assumed.       Verified patient name and date of birth and scheduled procedure. Attained informed consent with patient.  Reviewed general post procedural instructions.  Assessed patient. Awake, alert, and oriented per baseline. Vital signs stable (see vital sign flowsheet). Respiratory status WNL. Abdomen distended, tight. Skin thin, fragile.     Assessed and verified IV during pre-procedure.     Initial RN admission and assessment performed and documented in Endoscopy navigator.     Patient evaluated by anesthesia in pre-procedure holding.     All procedural vital signs, airway assessment, and level of consciousness information monitored and recorded by anesthesia staff on the anesthesia record.     Report received from CRNA post procedure.  Patient transported to recovery area by RN.    Endoscopy post procedure time out was performed and specimens were verified with physician.    Endoscope was pre-cleaned at bedside immediately following procedure by BRAD.    IP endoscopy recovery  Patient returned to baseline. Vital signs stable (see vital sign flowsheet). Patient remaining NPO. Respiratory status and skin returned to baseline prior to procedure. Abdomen now soft, patient reporting no more abdominal pain. Reviewed post-procedure information and instructions with patient, and patient given opportunity to ask questions.       TRANSFER - OUT REPORT:    Verbal report given to Edouard Sterling Jr.  being transferred to ER26 for routine progression of patient care

## 2025-06-19 NOTE — ED NOTES
TRANSFER - OUT REPORT:    Verbal report given to VIKY Murrieta on Theron Sterling Jr.  being transferred to [facility] ED for routine progression of patient care       Report consisted of patient's Situation, Background, Assessment and   Recommendations(SBAR).     Information from the following report(s) ED Encounter Summary, ED SBAR, MAR, Recent Results, Cardiac Rhythm NSR, and Neuro Assessment was reviewed with the receiving nurse.    Norden Fall Assessment:    Presents to emergency department  because of falls (Syncope, seizure, or loss of consciousness): No  Age > 70: Yes  Altered Mental Status, Intoxication with alcohol or substance confusion (Disorientation, impaired judgment, poor safety awaremess, or inability to follow instructions): No  Impaired Mobility: Ambulates or transfers with assistive devices or assistance; Unable to ambulate or transer.: No  Nursing Judgement: Yes          Lines:   Peripheral IV 06/19/25 Distal;Right;Anterior Cephalic (Active)   Site Assessment Clean, dry & intact 06/19/25 1034   Line Status Normal saline locked 06/19/25 1034   Phlebitis Assessment No symptoms 06/19/25 1034   Infiltration Assessment 0 06/19/25 1034   Alcohol Cap Used Yes 06/19/25 1034   Dressing Status Clean, dry & intact 06/19/25 1034   Dressing Type Transparent 06/19/25 1034       Peripheral IV 06/19/25 Left;Posterior Wrist (Active)   Site Assessment Clean, dry & intact 06/19/25 1211   Line Status Blood return noted;Flushed 06/19/25 1211   Phlebitis Assessment No symptoms 06/19/25 1211   Infiltration Assessment 0 06/19/25 1211   Dressing Status New dressing applied 06/19/25 1211   Dressing Type Transparent 06/19/25 1211        Opportunity for questions and clarification was provided.      Patient transported with:  Monitor  SULAIMAN MOMIN

## 2025-06-19 NOTE — ED TRIAGE NOTES
Patient arrives with lower abdominal pain that radiates to lower back. Reports diarrhea over the weekend. Denies n/v/urinary symptoms.

## 2025-06-19 NOTE — CONSULTS
[facility]  General Surgery ER Consultation        Admit Date: 6/19/2025  Reason for Consultation: sigmoid volvulus     HPI:  Theron Sterling Jr. is a 75 y.o. male with past medical history significant for Parkinson disease, dementia, hypertension and GERD presented to [facility] with complaints of abdominal discomfort and loose stools over the past week and a half. Denies nausea/vomiting. Denies fever/chills.  No abdominal surgical history. CT abd/pelvis showed sigmoid volvulus with counterclockwise rotation. Pt transferred to [facility] for GI and surgical evaluation and management of volvulus.       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, with anxiety (HCC) 05/19/2025     Past Medical History:   Diagnosis Date    Chronic back pain     GERD (gastroesophageal reflux disease)     Hypertension     Parkinson's disease (HCC)     Tremor Don’t know      Past Surgical History:   Procedure Laterality Date    UROLOGICAL SURGERY      \"routing surgery\"      Social History     Tobacco Use    Smoking status: Never    Smokeless tobacco: Never   Substance Use Topics    Alcohol use: Not Currently     Alcohol/week: 14.0 standard drinks of alcohol      Family History   Problem Relation Age of Onset    Alzheimer's Disease Mother       Prior to Admission medications    Medication Sig Start Date End Date Taking? Authorizing Provider   carbidopa-levodopa (SINEMET)  MG per tablet Take 1 tablet by mouth 4 times daily 6/16/25  Yes Vignesh Chisholm DO   docusate (COLACE, DULCOLAX) 100 MG CAPS Take 100 mg by mouth as needed 5/4/22  Yes Provider, Historical, MD   dutasteride (AVODART) 0.5 MG capsule Take 1 capsule by mouth daily   Yes Provider, Historical, MD   esomeprazole (NEXIUM) 40 MG delayed release capsule Take 1 capsule by mouth daily 7/19/22  Yes Provider, Historical, MD   mirtazapine (REMERON) 7.5 MG tablet Take 1

## 2025-06-19 NOTE — OP NOTE
[facility]  [address], [place] [address]        Colonoscopy Operative Report    Theron Sterling Jr.  [id number]  1950      Procedure Type:   Colonoscopy with rectal tube placement    Indications:  sigmoid volvulus        Pre-operative Diagnosis: see indication above    Post-operative Diagnosis:  See findings below    :  Sotero Florian MD    Staff: Circulator: Jasmine Chan RN  Endoscopy Technician: Rose Yousif     Referring Provider: Kwan Last MD      Sedation:  MAC      Procedure Details:  After informed consent was obtained with all risks and benefits of procedure explained and preoperative exam completed, the patient was taken to the endoscopy suite and placed in the left lateral decubitus position.  Upon sequential sedation as per above, a digital rectal exam was performed demonstrating internal hemorrhoids.  The Olympus pediatric videocolonoscope  was inserted in the rectum and carefully advanced to the transverse colon.  The quality of preparation was not adequate for screening purposes.  The colonoscope was slowly withdrawn with careful evaluation between folds. Retroflexion in the rectum was completed .     Findings:   In the sigmoid colon, a tortuous transition point was carefully traversed. Proximally, the colon was dilated. The scope was carefully advanced proximally while suctioning to decompress the lumen. The sigmoid colon appeared to be endoscopically reduced. Next, a rectal tube was placed under direct visualization with its proximal end in the transverse colon. There was mucosal trauma from the tube's introducer. This was treated with three Resolution 360 clips. While the preparation was not adequate, the visualized mucosa throughout was normal appearing and did not appear ischemic.  Following removal of the scope, the patient's abdominal exam was soft and the previously appreciated abdominal distention resolved.      Specimen

## 2025-06-19 NOTE — ANESTHESIA PRE PROCEDURE
Department of Anesthesiology  Preprocedure Note       Name:  Theron Sterling Jr.   Age:  75 y.o.  :  1950                                          MRN:  [id number]         Date:  2025      Surgeon: Surgeon(s):  Sotero Florian MD    Procedure: Procedure(s):  COLONOSCOPY FLEXIBLE DECOMPRESSION    Medications prior to admission:   Prior to Admission medications    Medication Sig Start Date End Date Taking? Authorizing Provider   carbidopa-levodopa (SINEMET)  MG per tablet Take 1 tablet by mouth 4 times daily 25  Yes Vignesh Chisholm DO   docusate (COLACE, DULCOLAX) 100 MG CAPS Take 100 mg by mouth as needed 22  Yes Provider, MD Jose   dutasteride (AVODART) 0.5 MG capsule Take 1 capsule by mouth daily   Yes Provider, MD Jose   esomeprazole (NEXIUM) 40 MG delayed release capsule Take 1 capsule by mouth daily 22  Yes Provider, MD Jose   mirtazapine (REMERON) 7.5 MG tablet Take 1 tablet by mouth at bedtime 22  Yes Provider, MD Jose   telmisartan-hydroCHLOROthiazide (MICARDIS HCT) 80-12.5 MG per tablet Take by mouth in the morning and at bedtime 6/3/22  Yes Provider, MD Jose   donepezil (ARICEPT) 5 MG tablet Take 1 tablet by mouth nightly 25   Vignesh Chisholm DO       Current medications:    Current Facility-Administered Medications   Medication Dose Route Frequency Provider Last Rate Last Admin   • potassium chloride 10 mEq/100 mL IVPB (Peripheral Line)  10 mEq IntraVENous NOW Jeff Zarate MD       • sodium chloride flush 0.9 % injection 5-40 mL  5-40 mL IntraVENous 2 times per day Jeff Zarate MD       • sodium chloride flush 0.9 % injection 5-40 mL  5-40 mL IntraVENous PRN Jfef Zarate MD       • 0.9 % sodium chloride infusion   IntraVENous PRN Jeff Zarate MD       • potassium chloride (KLOR-CON) extended release tablet 40 mEq  40 mEq Oral PRN Jeff aZrate MD        Or   • potassium bicarb-citric acid (EFFER-K)

## 2025-06-19 NOTE — ED NOTES
Evaluated upon arrival to this ER.  In no acute distress, vitals are remained stable.  No altered mental status or neurologic deficits.  GI is down here to evaluate him now.   General surgery contacted.  I messaged the hospitalist to let them know patient is here     Jonny Messina MD  06/19/25 3893

## 2025-06-20 ENCOUNTER — PREP FOR PROCEDURE (OUTPATIENT)
Age: 75
End: 2025-06-20

## 2025-06-20 LAB
ALBUMIN SERPL-MCNC: 3.2 G/DL (ref 3.5–5)
ALBUMIN/GLOB SERPL: 1.2 (ref 1.1–2.2)
ALP SERPL-CCNC: 62 U/L (ref 45–117)
ALT SERPL-CCNC: 17 U/L (ref 12–78)
ANION GAP SERPL CALC-SCNC: 10 MMOL/L (ref 2–12)
AST SERPL-CCNC: 11 U/L (ref 15–37)
BASOPHILS # BLD: 0.04 K/UL (ref 0–0.1)
BASOPHILS NFR BLD: 0.6 % (ref 0–1)
BILIRUB SERPL-MCNC: 0.8 MG/DL (ref 0.2–1)
BUN SERPL-MCNC: 12 MG/DL (ref 6–20)
BUN/CREAT SERPL: 12 (ref 12–20)
CALCIUM SERPL-MCNC: 9.3 MG/DL (ref 8.5–10.1)
CHLORIDE SERPL-SCNC: 98 MMOL/L (ref 97–108)
CO2 SERPL-SCNC: 26 MMOL/L (ref 21–32)
CREAT SERPL-MCNC: 1.04 MG/DL (ref 0.7–1.3)
DIFFERENTIAL METHOD BLD: ABNORMAL
EOSINOPHIL # BLD: 0.29 K/UL (ref 0–0.4)
EOSINOPHIL NFR BLD: 4 % (ref 0–7)
ERYTHROCYTE [DISTWIDTH] IN BLOOD BY AUTOMATED COUNT: 12.6 % (ref 11.5–14.5)
GLOBULIN SER CALC-MCNC: 2.7 G/DL (ref 2–4)
GLUCOSE SERPL-MCNC: 78 MG/DL (ref 65–100)
HCT VFR BLD AUTO: 31.7 % (ref 36.6–50.3)
HGB BLD-MCNC: 11.1 G/DL (ref 12.1–17)
IMM GRANULOCYTES # BLD AUTO: 0.02 K/UL (ref 0–0.04)
IMM GRANULOCYTES NFR BLD AUTO: 0.3 % (ref 0–0.5)
LYMPHOCYTES # BLD: 0.69 K/UL (ref 0.8–3.5)
LYMPHOCYTES NFR BLD: 9.6 % (ref 12–49)
MAGNESIUM SERPL-MCNC: 2 MG/DL (ref 1.6–2.4)
MCH RBC QN AUTO: 32.4 PG (ref 26–34)
MCHC RBC AUTO-ENTMCNC: 35 G/DL (ref 30–36.5)
MCV RBC AUTO: 92.4 FL (ref 80–99)
MONOCYTES # BLD: 0.69 K/UL (ref 0–1)
MONOCYTES NFR BLD: 9.6 % (ref 5–13)
NEUTS SEG # BLD: 5.47 K/UL (ref 1.8–8)
NEUTS SEG NFR BLD: 75.9 % (ref 32–75)
NRBC # BLD: 0 K/UL (ref 0–0.01)
NRBC BLD-RTO: 0 PER 100 WBC
PHOSPHATE SERPL-MCNC: 2.2 MG/DL (ref 2.6–4.7)
PLATELET # BLD AUTO: 224 K/UL (ref 150–400)
PMV BLD AUTO: 11.6 FL (ref 8.9–12.9)
POTASSIUM SERPL-SCNC: 2.5 MMOL/L (ref 3.5–5.1)
PROT SERPL-MCNC: 5.9 G/DL (ref 6.4–8.2)
RBC # BLD AUTO: 3.43 M/UL (ref 4.1–5.7)
RBC MORPH BLD: ABNORMAL
SODIUM SERPL-SCNC: 134 MMOL/L (ref 136–145)
TSH SERPL DL<=0.05 MIU/L-ACNC: 1.65 UIU/ML (ref 0.36–3.74)
WBC # BLD AUTO: 7.2 K/UL (ref 4.1–11.1)

## 2025-06-20 PROCEDURE — 2580000003 HC RX 258: Performed by: NURSE PRACTITIONER

## 2025-06-20 PROCEDURE — 85025 COMPLETE CBC W/AUTO DIFF WBC: CPT

## 2025-06-20 PROCEDURE — 80053 COMPREHEN METABOLIC PANEL: CPT

## 2025-06-20 PROCEDURE — 6360000002 HC RX W HCPCS: Performed by: NURSE PRACTITIONER

## 2025-06-20 PROCEDURE — 1100000000 HC RM PRIVATE

## 2025-06-20 PROCEDURE — 84132 ASSAY OF SERUM POTASSIUM: CPT

## 2025-06-20 PROCEDURE — 83735 ASSAY OF MAGNESIUM: CPT

## 2025-06-20 PROCEDURE — 6370000000 HC RX 637 (ALT 250 FOR IP): Performed by: NURSE PRACTITIONER

## 2025-06-20 PROCEDURE — 2500000003 HC RX 250 WO HCPCS: Performed by: NURSE PRACTITIONER

## 2025-06-20 PROCEDURE — 6360000002 HC RX W HCPCS: Performed by: INTERNAL MEDICINE

## 2025-06-20 PROCEDURE — 99232 SBSQ HOSP IP/OBS MODERATE 35: CPT | Performed by: SURGERY

## 2025-06-20 PROCEDURE — 2580000003 HC RX 258: Performed by: INTERNAL MEDICINE

## 2025-06-20 PROCEDURE — 6370000000 HC RX 637 (ALT 250 FOR IP): Performed by: SURGERY

## 2025-06-20 PROCEDURE — 84443 ASSAY THYROID STIM HORMONE: CPT

## 2025-06-20 PROCEDURE — 84100 ASSAY OF PHOSPHORUS: CPT

## 2025-06-20 PROCEDURE — 2500000003 HC RX 250 WO HCPCS: Performed by: INTERNAL MEDICINE

## 2025-06-20 RX ORDER — MAGNESIUM SULFATE IN WATER 40 MG/ML
2000 INJECTION, SOLUTION INTRAVENOUS ONCE
Status: COMPLETED | OUTPATIENT
Start: 2025-06-20 | End: 2025-06-20

## 2025-06-20 RX ORDER — MIRTAZAPINE 15 MG/1
7.5 TABLET, FILM COATED ORAL NIGHTLY
Status: DISCONTINUED | OUTPATIENT
Start: 2025-06-20 | End: 2025-06-27 | Stop reason: HOSPADM

## 2025-06-20 RX ORDER — CARBIDOPA AND LEVODOPA 25; 100 MG/1; MG/1
1 TABLET ORAL 4 TIMES DAILY
Status: DISCONTINUED | OUTPATIENT
Start: 2025-06-20 | End: 2025-06-27 | Stop reason: HOSPADM

## 2025-06-20 RX ORDER — TELMISARTAN AND HYDROCHLORTHIAZIDE 80; 12.5 MG/1; MG/1
1 TABLET ORAL 2 TIMES DAILY
Status: DISCONTINUED | OUTPATIENT
Start: 2025-06-20 | End: 2025-06-20

## 2025-06-20 RX ORDER — LOSARTAN POTASSIUM 50 MG/1
100 TABLET ORAL DAILY
Status: DISCONTINUED | OUTPATIENT
Start: 2025-06-20 | End: 2025-06-27 | Stop reason: HOSPADM

## 2025-06-20 RX ORDER — POTASSIUM CHLORIDE 7.45 MG/ML
10 INJECTION INTRAVENOUS
Status: DISPENSED | OUTPATIENT
Start: 2025-06-20 | End: 2025-06-20

## 2025-06-20 RX ORDER — FINASTERIDE 5 MG/1
5 TABLET, FILM COATED ORAL DAILY
Status: DISCONTINUED | OUTPATIENT
Start: 2025-06-20 | End: 2025-06-27 | Stop reason: HOSPADM

## 2025-06-20 RX ORDER — HYDROCHLOROTHIAZIDE 25 MG/1
12.5 TABLET ORAL DAILY
Status: DISCONTINUED | OUTPATIENT
Start: 2025-06-20 | End: 2025-06-27 | Stop reason: HOSPADM

## 2025-06-20 RX ORDER — POTASSIUM CHLORIDE 7.45 MG/ML
10 INJECTION INTRAVENOUS
Status: COMPLETED | OUTPATIENT
Start: 2025-06-20 | End: 2025-06-20

## 2025-06-20 RX ORDER — DONEPEZIL HYDROCHLORIDE 5 MG/1
5 TABLET, FILM COATED ORAL NIGHTLY
Status: DISCONTINUED | OUTPATIENT
Start: 2025-06-20 | End: 2025-06-20

## 2025-06-20 RX ADMIN — POTASSIUM CHLORIDE 10 MEQ: 7.46 INJECTION, SOLUTION INTRAVENOUS at 09:53

## 2025-06-20 RX ADMIN — SODIUM CHLORIDE: 0.9 INJECTION, SOLUTION INTRAVENOUS at 22:07

## 2025-06-20 RX ADMIN — POTASSIUM CHLORIDE 10 MEQ: 7.46 INJECTION, SOLUTION INTRAVENOUS at 11:16

## 2025-06-20 RX ADMIN — LOSARTAN POTASSIUM 100 MG: 50 TABLET, FILM COATED ORAL at 19:20

## 2025-06-20 RX ADMIN — POTASSIUM CHLORIDE 10 MEQ: 10 INJECTION, SOLUTION INTRAVENOUS at 16:53

## 2025-06-20 RX ADMIN — SODIUM CHLORIDE, PRESERVATIVE FREE 10 ML: 5 INJECTION INTRAVENOUS at 21:28

## 2025-06-20 RX ADMIN — HYDROCHLOROTHIAZIDE 12.5 MG: 25 TABLET ORAL at 19:17

## 2025-06-20 RX ADMIN — MAGNESIUM SULFATE HEPTAHYDRATE 2000 MG: 40 INJECTION, SOLUTION INTRAVENOUS at 06:40

## 2025-06-20 RX ADMIN — SODIUM CHLORIDE 25 ML: 0.9 INJECTION, SOLUTION INTRAVENOUS at 09:50

## 2025-06-20 RX ADMIN — SODIUM PHOSPHATE, MONOBASIC, MONOHYDRATE AND SODIUM PHOSPHATE, DIBASIC, ANHYDROUS 15 MMOL: 276; 142 INJECTION, SOLUTION INTRAVENOUS at 07:51

## 2025-06-20 RX ADMIN — ENOXAPARIN SODIUM 40 MG: 100 INJECTION SUBCUTANEOUS at 19:11

## 2025-06-20 RX ADMIN — SODIUM CHLORIDE: 0.9 INJECTION, SOLUTION INTRAVENOUS at 03:23

## 2025-06-20 RX ADMIN — FINASTERIDE 5 MG: 5 TABLET, FILM COATED ORAL at 19:19

## 2025-06-20 RX ADMIN — MIRTAZAPINE 7.5 MG: 15 TABLET, FILM COATED ORAL at 21:55

## 2025-06-20 RX ADMIN — POTASSIUM CHLORIDE 10 MEQ: 7.46 INJECTION, SOLUTION INTRAVENOUS at 12:54

## 2025-06-20 RX ADMIN — POTASSIUM CHLORIDE 10 MEQ: 10 INJECTION, SOLUTION INTRAVENOUS at 15:19

## 2025-06-20 RX ADMIN — CARBIDOPA AND LEVODOPA 1 TABLET: 25; 100 TABLET ORAL at 21:55

## 2025-06-20 RX ADMIN — POTASSIUM CHLORIDE 10 MEQ: 7.46 INJECTION, SOLUTION INTRAVENOUS at 06:30

## 2025-06-20 ASSESSMENT — PAIN DESCRIPTION - PAIN TYPE: TYPE: ACUTE PAIN

## 2025-06-20 ASSESSMENT — PAIN DESCRIPTION - ORIENTATION: ORIENTATION: LOWER

## 2025-06-20 ASSESSMENT — PAIN SCALES - GENERAL
PAINLEVEL_OUTOF10: 1
PAINLEVEL_OUTOF10: 0
PAINLEVEL_OUTOF10: 2

## 2025-06-20 ASSESSMENT — PAIN DESCRIPTION - ONSET: ONSET: SUDDEN

## 2025-06-20 ASSESSMENT — PAIN - FUNCTIONAL ASSESSMENT: PAIN_FUNCTIONAL_ASSESSMENT: PREVENTS OR INTERFERES SOME ACTIVE ACTIVITIES AND ADLS

## 2025-06-20 ASSESSMENT — PAIN DESCRIPTION - DESCRIPTORS: DESCRIPTORS: ACHING

## 2025-06-20 ASSESSMENT — PAIN DESCRIPTION - FREQUENCY: FREQUENCY: INTERMITTENT

## 2025-06-20 ASSESSMENT — PAIN DESCRIPTION - LOCATION: LOCATION: ABDOMEN

## 2025-06-20 NOTE — PROGRESS NOTES
Hospitalist Progress Note  BYRON Nieves NP  Answering service: [phone] OR [phone] from in house phone        Date of Service:  2025  NAME:  Theron Sterling Jr.  :  1950  MRN:  [id number]      Admission Summary:     Theron Sterling Jr. is a 75 y.o. male with past medical history significant for dementia, Parkinson disease, hypertension initially presented at [facility] emergency room at [facility] with abdominal pain.  Patient symptoms started a couple of days ago and got worse overnight.  The pain is diffuse in location, pressure-like, no radiation, no known aggravating or relieving factors and patient is unable to state the severity of the pain.  Patient is also not able to provide good history because of his underlying dementia.  He also reported diarrhea without nausea and vomiting.  He has no record of a prior admission to this hospital.  CT scan of the abdomen and pelvis done in the emergency room showed sigmoid volvulus.  Patient was transferred to the emergency room at [facility] for urgent GI and general surgery consult.  He was subsequently referred to the hospitalist service for admission.          Interval history / Subjective:     Patient is s/p endoscopic decompression/reduction of sigmoid volvulus with Dr. Florian yesterday afternoon. Rectal tube placed for decompressive efforts. The patient was seen by Dr. Gamboa this morning who recommended surgical fixation of the volvulus, the patient and family are discussing.     Patient advanced to clear liquid diet today.     Significant electrolyte abnormalities this AM repleted by night shift.      Assessment & Plan:      Sigmoid Volvulus   - reduced endoscopically with Chiqui on    - additional interventions include non operative 'watch and wait' vs. Elective sigmoid resection per Dr. Gamboa   - family and patient

## 2025-06-20 NOTE — PROGRESS NOTES
Department of General Surgery - Adult  Surgical Service   Attending Progress Note      SUBJECTIVE:  Denies abdominal pain; hungry.    OBJECTIVE      Physical    VITALS:  /68   Pulse 65   Temp 97.5 °F (36.4 °C) (Oral)   Resp 16   Wt 85.3 kg (188 lb 0.8 oz)   SpO2 100%   BMI 24.14 kg/m²   INTAKE/OUTPUT:    Intake/Output Summary (Last 24 hours) at 6/20/2025 0936  Last data filed at 6/20/2025 0500  Gross per 24 hour   Intake 1400 ml   Output 900 ml   Net 500 ml     ABDOMEN:  Non-distended, soft, non-tender.    Data  CBC with Differential:    Lab Results   Component Value Date/Time    WBC 7.2 06/20/2025 03:28 AM    RBC 3.43 06/20/2025 03:28 AM    HGB 11.1 06/20/2025 03:28 AM    HCT 31.7 06/20/2025 03:28 AM     06/20/2025 03:28 AM    MCV 92.4 06/20/2025 03:28 AM    MCH 32.4 06/20/2025 03:28 AM    MCHC 35.0 06/20/2025 03:28 AM    RDW 12.6 06/20/2025 03:28 AM    NRBC 0.0 06/20/2025 03:28 AM    NRBC 0.00 06/20/2025 03:28 AM    LYMPHOPCT 9.6 06/20/2025 03:28 AM    MONOPCT 9.6 06/20/2025 03:28 AM    EOSPCT 4.0 06/20/2025 03:28 AM    BASOPCT 0.6 06/20/2025 03:28 AM    MONOSABS 0.69 06/20/2025 03:28 AM    LYMPHSABS 0.69 06/20/2025 03:28 AM    EOSABS 0.29 06/20/2025 03:28 AM    BASOSABS 0.04 06/20/2025 03:28 AM    DIFFTYPE SMEAR SCANNED 06/20/2025 03:28 AM     CMP:    Lab Results   Component Value Date/Time     06/20/2025 03:28 AM    K 2.5 06/20/2025 03:28 AM    CL 98 06/20/2025 03:28 AM    CO2 26 06/20/2025 03:28 AM    BUN 12 06/20/2025 03:28 AM    CREATININE 1.04 06/20/2025 03:28 AM    GFRAA >60 10/01/2019 03:47 PM    AGRATIO 1.2 10/01/2019 03:47 PM    LABGLOM 75 06/20/2025 03:28 AM    GLUCOSE 78 06/20/2025 03:28 AM    CALCIUM 9.3 06/20/2025 03:28 AM    BILITOT 0.8 06/20/2025 03:28 AM    ALKPHOS 62 06/20/2025 03:28 AM    ALKPHOS 58 10/01/2019 03:47 PM    AST 11 06/20/2025 03:28 AM    ALT 17 06/20/2025 03:28 AM     BMP:    Lab Results   Component Value Date/Time     06/20/2025 03:28 AM    K 2.5

## 2025-06-20 NOTE — ED NOTES
ED TO INPATIENT SBAR HANDOFF    Patient Name: Theron Sterling Jr.   :  1950  75 y.o.   MRN:  [id number]  ED Room #:  ER01/01     Situation  Code Status: Full Code   Allergies: Patient has no known allergies.  Weight: Patient Vitals for the past 96 hrs (Last 3 readings):   Weight   25 1009 85.3 kg (188 lb 0.8 oz)       Arrived from: home    Chief Complaint:   Chief Complaint   Patient presents with    Diarrhea    Abdominal Pain       Hospital Problem/Diagnosis:  Principal Problem:    Sigmoid volvulus (HCC)  Resolved Problems:    * No resolved hospital problems. *      Mobility: no current mobility problem   ED Fall Risk: Presents to emergency department  because of falls (Syncope, seizure, or loss of consciousness): No, Age > 70: Yes, Altered Mental Status, Intoxication with alcohol or substance confusion (Disorientation, impaired judgment, poor safety awaremess, or inability to follow instructions): No, Impaired Mobility: Ambulates or transfers with assistive devices or assistance; Unable to ambulate or transer.: No, Nursing Judgement: Yes   Fell in ED or prior to admission: no   Restraints: no     Sitter: no   Family/Caregiver Present: no    Neet to know social/safety information: Hx of Parkinsons    Background  History:   Past Medical History:   Diagnosis Date    Chronic back pain     GERD (gastroesophageal reflux disease)     Hypertension     Parkinson's disease (HCC)     Tremor Don’t know       Assessment    Abnormal Assessment Findings: Sigmoid volvulus (now has rectal tube), diarrhea now present   Imaging:   XR CHEST PORTABLE   Final Result   No acute cardiopulmonary process.      Electronically signed by Emery Moon      CT ABDOMEN PELVIS W IV CONTRAST Additional Contrast? None   Final Result   Sigmoid volvulus with counterclockwise rotation.    Other incidentals as described above.   This result was verbally relayed by me to Dr. Trevizo at 1204 hours.   789            Electronically signed by

## 2025-06-20 NOTE — H&P
History and Physical    Date of Service:  6/19/2025  Primary Care Provider: Kwan Last MD  Source of information: The patient, patient's daughter and spouse at bedside and review of EMR    Chief Complaint: Diarrhea and Abdominal Pain      History of Presenting Illness:   Theron Sterling Jr. is a 75 y.o. male with past medical history significant for dementia, Parkinson disease, hypertension initially presented at [facility] emergency room at [facility] with abdominal pain.  Patient symptoms started a couple of days ago and got worse overnight.  The pain is diffuse in location, pressure-like, no radiation, no known aggravating or relieving factors and patient is unable to state the severity of the pain.  Patient is also not able to provide good history because of his underlying dementia.  He also reported diarrhea without nausea and vomiting.  He has no record of a prior admission to this hospital.  CT scan of the abdomen and pelvis done in the emergency room showed sigmoid volvulus.  Patient was transferred to the emergency room at [facility] for urgent GI and general surgery consult.  He was subsequently referred to the hospitalist service for admission.         REVIEW OF SYSTEMS:  Review of systems not obtained due to patient factors.     Past Medical History:   Diagnosis Date    Chronic back pain     GERD (gastroesophageal reflux disease)     Hypertension     Parkinson's disease (HCC)     Tremor Don’t know      Past Surgical History:   Procedure Laterality Date    UROLOGICAL SURGERY      \"routing surgery\"     Prior to Admission medications    Medication Sig Start Date End Date Taking? Authorizing Provider   carbidopa-levodopa (SINEMET)  MG per tablet Take 1 tablet by mouth 4 times daily 6/16/25  Yes Vignesh Chisholm DO   docusate (COLACE, DULCOLAX) 100 MG CAPS Take 100 mg by mouth as needed 5/4/22  Yes Provider, MD Jose   dutasteride (AVODART) 0.5 MG capsule

## 2025-06-20 NOTE — PROGRESS NOTES
Hospitalist Night Cover     Name: Theron Sterling JrBharat  YOB: 1950      Overnight update:        Notified of K+ 2.5. Noted Magnesium 1.4 and Phos 2.2. Patient currently NPO  VS: /63, HR 68, RR 16, O2 sat 96% on RA    - KCL 10 meq IV x 6 doses, repeat K+ @ 2PM  - Magnesium 2 g IV x 1 dose, Magnesium level in AM  - NaPhos 15 mmol IV x 1 dose, BMP and Phos is AM     ABNER Quintanilla

## 2025-06-20 NOTE — PROGRESS NOTES
0600-Per chemistry lab , patient's lab potassium is 2.5 this morning , NP made aware awaiting response

## 2025-06-21 LAB
ANION GAP SERPL CALC-SCNC: 9 MMOL/L (ref 2–12)
BASOPHILS # BLD: 0.04 K/UL (ref 0–0.1)
BASOPHILS NFR BLD: 0.7 % (ref 0–1)
BUN SERPL-MCNC: 8 MG/DL (ref 6–20)
BUN/CREAT SERPL: 7 (ref 12–20)
CALCIUM SERPL-MCNC: 8.8 MG/DL (ref 8.5–10.1)
CHLORIDE SERPL-SCNC: 97 MMOL/L (ref 97–108)
CO2 SERPL-SCNC: 27 MMOL/L (ref 21–32)
CREAT SERPL-MCNC: 1.16 MG/DL (ref 0.7–1.3)
DIFFERENTIAL METHOD BLD: ABNORMAL
EOSINOPHIL # BLD: 0.16 K/UL (ref 0–0.4)
EOSINOPHIL NFR BLD: 2.6 % (ref 0–7)
ERYTHROCYTE [DISTWIDTH] IN BLOOD BY AUTOMATED COUNT: 12.7 % (ref 11.5–14.5)
GLUCOSE BLD STRIP.AUTO-MCNC: 77 MG/DL (ref 65–117)
GLUCOSE SERPL-MCNC: 81 MG/DL (ref 65–100)
HCT VFR BLD AUTO: 37.2 % (ref 36.6–50.3)
HGB BLD-MCNC: 12.2 G/DL (ref 12.1–17)
IMM GRANULOCYTES # BLD AUTO: 0.02 K/UL (ref 0–0.04)
IMM GRANULOCYTES NFR BLD AUTO: 0.3 % (ref 0–0.5)
LYMPHOCYTES # BLD: 0.54 K/UL (ref 0.8–3.5)
LYMPHOCYTES NFR BLD: 8.8 % (ref 12–49)
MAGNESIUM SERPL-MCNC: 1.8 MG/DL (ref 1.6–2.4)
MCH RBC QN AUTO: 32.3 PG (ref 26–34)
MCHC RBC AUTO-ENTMCNC: 32.8 G/DL (ref 30–36.5)
MCV RBC AUTO: 98.4 FL (ref 80–99)
MONOCYTES # BLD: 0.41 K/UL (ref 0–1)
MONOCYTES NFR BLD: 6.7 % (ref 5–13)
NEUTS SEG # BLD: 4.93 K/UL (ref 1.8–8)
NEUTS SEG NFR BLD: 80.9 % (ref 32–75)
NRBC # BLD: 0 K/UL (ref 0–0.01)
NRBC BLD-RTO: 0 PER 100 WBC
PHOSPHATE SERPL-MCNC: 2.5 MG/DL (ref 2.6–4.7)
PLATELET # BLD AUTO: 259 K/UL (ref 150–400)
PMV BLD AUTO: 11.2 FL (ref 8.9–12.9)
POTASSIUM SERPL-SCNC: 2.8 MMOL/L (ref 3.5–5.1)
POTASSIUM SERPL-SCNC: 3.3 MMOL/L (ref 3.5–5.1)
POTASSIUM SERPL-SCNC: 3.5 MMOL/L (ref 3.5–5.1)
RBC # BLD AUTO: 3.78 M/UL (ref 4.1–5.7)
RBC MORPH BLD: ABNORMAL
SERVICE CMNT-IMP: NORMAL
SODIUM SERPL-SCNC: 133 MMOL/L (ref 136–145)
WBC # BLD AUTO: 6.1 K/UL (ref 4.1–11.1)

## 2025-06-21 PROCEDURE — 2500000003 HC RX 250 WO HCPCS: Performed by: INTERNAL MEDICINE

## 2025-06-21 PROCEDURE — 80048 BASIC METABOLIC PNL TOTAL CA: CPT

## 2025-06-21 PROCEDURE — 6370000000 HC RX 637 (ALT 250 FOR IP): Performed by: NURSE PRACTITIONER

## 2025-06-21 PROCEDURE — 99232 SBSQ HOSP IP/OBS MODERATE 35: CPT | Performed by: SURGERY

## 2025-06-21 PROCEDURE — 85025 COMPLETE CBC W/AUTO DIFF WBC: CPT

## 2025-06-21 PROCEDURE — 83735 ASSAY OF MAGNESIUM: CPT

## 2025-06-21 PROCEDURE — 84100 ASSAY OF PHOSPHORUS: CPT

## 2025-06-21 PROCEDURE — 1100000000 HC RM PRIVATE

## 2025-06-21 PROCEDURE — 6360000002 HC RX W HCPCS: Performed by: INTERNAL MEDICINE

## 2025-06-21 PROCEDURE — 6370000000 HC RX 637 (ALT 250 FOR IP): Performed by: SURGERY

## 2025-06-21 PROCEDURE — 6360000002 HC RX W HCPCS: Performed by: NURSE PRACTITIONER

## 2025-06-21 PROCEDURE — 82962 GLUCOSE BLOOD TEST: CPT

## 2025-06-21 PROCEDURE — 6370000000 HC RX 637 (ALT 250 FOR IP): Performed by: INTERNAL MEDICINE

## 2025-06-21 PROCEDURE — 84132 ASSAY OF SERUM POTASSIUM: CPT

## 2025-06-21 RX ORDER — NEOMYCIN SULFATE 500 MG/1
500 TABLET ORAL
Status: COMPLETED | OUTPATIENT
Start: 2025-06-22 | End: 2025-06-22

## 2025-06-21 RX ORDER — NEOMYCIN SULFATE 500 MG/1
500 TABLET ORAL ONCE
Status: COMPLETED | OUTPATIENT
Start: 2025-06-22 | End: 2025-06-22

## 2025-06-21 RX ORDER — POTASSIUM CHLORIDE 7.45 MG/ML
10 INJECTION INTRAVENOUS
Status: COMPLETED | OUTPATIENT
Start: 2025-06-21 | End: 2025-06-21

## 2025-06-21 RX ORDER — METRONIDAZOLE 250 MG/1
500 TABLET ORAL
Status: COMPLETED | OUTPATIENT
Start: 2025-06-22 | End: 2025-06-22

## 2025-06-21 RX ORDER — METOCLOPRAMIDE 10 MG/1
10 TABLET ORAL EVERY 6 HOURS
Status: COMPLETED | OUTPATIENT
Start: 2025-06-22 | End: 2025-06-22

## 2025-06-21 RX ORDER — METRONIDAZOLE 250 MG/1
500 TABLET ORAL ONCE
Status: COMPLETED | OUTPATIENT
Start: 2025-06-22 | End: 2025-06-22

## 2025-06-21 RX ADMIN — CARBIDOPA AND LEVODOPA 1 TABLET: 25; 100 TABLET ORAL at 08:31

## 2025-06-21 RX ADMIN — POTASSIUM CHLORIDE 10 MEQ: 10 INJECTION, SOLUTION INTRAVENOUS at 03:10

## 2025-06-21 RX ADMIN — SODIUM CHLORIDE, PRESERVATIVE FREE 10 ML: 5 INJECTION INTRAVENOUS at 08:32

## 2025-06-21 RX ADMIN — POTASSIUM CHLORIDE 10 MEQ: 10 INJECTION, SOLUTION INTRAVENOUS at 04:00

## 2025-06-21 RX ADMIN — CARBIDOPA AND LEVODOPA 1 TABLET: 25; 100 TABLET ORAL at 13:00

## 2025-06-21 RX ADMIN — CARBIDOPA AND LEVODOPA 1 TABLET: 25; 100 TABLET ORAL at 21:37

## 2025-06-21 RX ADMIN — LOSARTAN POTASSIUM 100 MG: 50 TABLET, FILM COATED ORAL at 08:30

## 2025-06-21 RX ADMIN — POTASSIUM CHLORIDE 10 MEQ: 7.46 INJECTION, SOLUTION INTRAVENOUS at 09:53

## 2025-06-21 RX ADMIN — ACETAMINOPHEN 650 MG: 325 TABLET ORAL at 21:35

## 2025-06-21 RX ADMIN — CARBIDOPA AND LEVODOPA 1 TABLET: 25; 100 TABLET ORAL at 16:57

## 2025-06-21 RX ADMIN — POTASSIUM CHLORIDE 10 MEQ: 10 INJECTION, SOLUTION INTRAVENOUS at 09:54

## 2025-06-21 RX ADMIN — ENOXAPARIN SODIUM 40 MG: 100 INJECTION SUBCUTANEOUS at 16:57

## 2025-06-21 RX ADMIN — MIRTAZAPINE 7.5 MG: 15 TABLET, FILM COATED ORAL at 21:36

## 2025-06-21 RX ADMIN — HYDROCHLOROTHIAZIDE 12.5 MG: 25 TABLET ORAL at 08:31

## 2025-06-21 RX ADMIN — POTASSIUM CHLORIDE 10 MEQ: 10 INJECTION, SOLUTION INTRAVENOUS at 05:13

## 2025-06-21 RX ADMIN — POTASSIUM CHLORIDE 10 MEQ: 10 INJECTION, SOLUTION INTRAVENOUS at 06:11

## 2025-06-21 RX ADMIN — FINASTERIDE 5 MG: 5 TABLET, FILM COATED ORAL at 08:29

## 2025-06-21 RX ADMIN — POTASSIUM CHLORIDE 10 MEQ: 10 INJECTION, SOLUTION INTRAVENOUS at 08:29

## 2025-06-21 ASSESSMENT — PAIN SCALES - GENERAL: PAINLEVEL_OUTOF10: 4

## 2025-06-21 NOTE — PROGRESS NOTES
Hospitalist Night Cover     Name: Theron Sterling Jr.  YOB: 1950      Overnight update:        Notified of K+ 2.8  VS: /70, HR 70, RR 16, O2 sat 95% on RA    - KCL 10 meq IV x 6 doses       Sandra Morin, ABNER

## 2025-06-21 NOTE — PLAN OF CARE
Problem: Pain  Goal: Verbalizes/displays adequate comfort level or baseline comfort level  6/21/2025 0857 by Ольга Fernandez RN  Outcome: Progressing  6/21/2025 0820 by Zeke Garcia RN  Outcome: Progressing  6/20/2025 2150 by Beatrice Carlson RN  Outcome: Progressing     Problem: Discharge Planning  Goal: Discharge to home or other facility with appropriate resources  6/21/2025 0857 by Ольга Fernandez RN  Outcome: Progressing  6/21/2025 0820 by Zeke Garcia RN  Outcome: Progressing     Problem: Safety - Adult  Goal: Free from fall injury  6/21/2025 0857 by Ольга Fernandez RN  Outcome: Progressing  6/21/2025 0820 by Zeke Garcia RN  Outcome: Progressing  6/20/2025 2150 by Beatrice Carlson RN  Outcome: Progressing     Problem: Skin/Tissue Integrity  Goal: Skin integrity remains intact  Description: 1.  Monitor for areas of redness and/or skin breakdown  2.  Assess vascular access sites hourly  3.  Every 4-6 hours minimum:  Change oxygen saturation probe site  4.  Every 4-6 hours:  If on nasal continuous positive airway pressure, respiratory therapy assess nares and determine need for appliance change or resting period  6/21/2025 0857 by Ольга Fernandez RN  Outcome: Progressing  6/21/2025 0820 by Zeke Garcia RN  Outcome: Progressing

## 2025-06-21 NOTE — PLAN OF CARE
Problem: Pain  Goal: Verbalizes/displays adequate comfort level or baseline comfort level  6/21/2025 0820 by Zeke Garcia RN  Outcome: Progressing     Problem: Discharge Planning  Goal: Discharge to home or other facility with appropriate resources  Outcome: Progressing     Problem: Safety - Adult  Goal: Free from fall injury  6/21/2025 0820 by Zeke Garcia RN  Outcome: Progressing     Problem: Skin/Tissue Integrity  Goal: Skin integrity remains intact  Description: 1.  Monitor for areas of redness and/or skin breakdown  2.  Assess vascular access sites hourly  3.  Every 4-6 hours minimum:  Change oxygen saturation probe site  4.  Every 4-6 hours:  If on nasal continuous positive airway pressure, respiratory therapy assess nares and determine need for appliance change or resting period  Outcome: Progressing

## 2025-06-21 NOTE — PROGRESS NOTES
Surgery Progress Note    6/21/2025    Admit Date: 6/19/2025  2:01 PM    CC: Confusion    Subjective:     Daughter at bedside.  Patient with moderate delirium.  Hyperfocused on surgery.    Constitutional: No fever or chills  Neurologic: No headache  Eyes: No scleral icterus or irritated eyes  Nose: No nasal pain or drainage  Mouth: No oral lesions or sore throat  Cardiac: No palpations or chest pain  Pulmonary: No cough or shortness of breath  Gastrointestinal: No nausea, emesis, diarrhea, or constipation  Genitourinary: No dysuria  Musculoskeletal: No muscle or joint tenderness  Skin: No rashes or lesions  Psychiatric: No anxiety or depressed mood    Objective:     Vitals:    06/21/25 1011   BP:    Pulse: 77   Resp:    Temp:    SpO2:        General: No acute distress, conversant  Eyes: PERRLA, no scleral icterus  HENT: Normocephalic without oral lesions  Neck: Trachea midline without LAD  Cardiac: Normal pulse rate and rhythm  Pulmonary: Symmetric chest rise with normal effort  GI: Soft, NT, ND, no hernia, no splenomegaly, rectal tube in place  Skin: Warm without rash  Extremities: No edema or joint stiffness  Psych: Appropriate mood and affect    Labs, vital signs, and I/O reviewed.    Assessment:     75-year-old male with sigmoid volvulus status post decompression    Plan:     Rectal tube management per GI.  On clear liquid diet.  Family has agreed to the surgery.  Possibly surgery Monday.  Will start colon prep with antibiotics and GoLytely on Sunday.    Surgery to follow    Rory Duran MD, [facility], [facility]  Bariatric and General Surgeon  [facility]

## 2025-06-21 NOTE — PROGRESS NOTES
Hospitalist Progress Note  BYRON Nieves NP  Answering service: [phone] OR [phone] from in house phone        Date of Service:  2025  NAME:  Theron Sterling Jr.  :  1950  MRN:  [id number]      Admission Summary:     Theron Sterling Jr. is a 75 y.o. male with past medical history significant for dementia, Parkinson disease, hypertension initially presented at [facility] emergency room at [facility] with abdominal pain.  Patient symptoms started a couple of days ago and got worse overnight.  The pain is diffuse in location, pressure-like, no radiation, no known aggravating or relieving factors and patient is unable to state the severity of the pain.  Patient is also not able to provide good history because of his underlying dementia.  He also reported diarrhea without nausea and vomiting.  He has no record of a prior admission to this hospital.  CT scan of the abdomen and pelvis done in the emergency room showed sigmoid volvulus.  Patient was transferred to the emergency room at [facility] for urgent GI and general surgery consult.  He was subsequently referred to the hospitalist service for admission.          Interval history / Subjective:     Patient is s/p endoscopic decompression/reduction of sigmoid volvulus with Dr. Florian on Thursday afternoon. Rectal tube placed for decompressive efforts which patient pulled out today. Will not replace given Chiqui comments not to do so.     The family is in agreement with surgery. Dr. Duran to plan for Monday surgery. Will start colon prep and antibiotics tomorrow per Dr. Duran.     Significant electrolyte abnormalities this AM repleted by night shift.      Assessment & Plan:     Sigmoid Volvulus   - reduced endoscopically with Chiqui on    - additional interventions include non operative 'watch and wait' vs. Elective sigmoid

## 2025-06-22 PROCEDURE — 99232 SBSQ HOSP IP/OBS MODERATE 35: CPT | Performed by: SURGERY

## 2025-06-22 PROCEDURE — 6370000000 HC RX 637 (ALT 250 FOR IP): Performed by: INTERNAL MEDICINE

## 2025-06-22 PROCEDURE — 1100000000 HC RM PRIVATE

## 2025-06-22 PROCEDURE — 6370000000 HC RX 637 (ALT 250 FOR IP): Performed by: SURGERY

## 2025-06-22 PROCEDURE — 6370000000 HC RX 637 (ALT 250 FOR IP): Performed by: NURSE PRACTITIONER

## 2025-06-22 PROCEDURE — 2500000003 HC RX 250 WO HCPCS: Performed by: INTERNAL MEDICINE

## 2025-06-22 PROCEDURE — 2500000003 HC RX 250 WO HCPCS: Performed by: NURSE PRACTITIONER

## 2025-06-22 RX ORDER — DEXTROSE MONOHYDRATE, SODIUM CHLORIDE, AND POTASSIUM CHLORIDE 50; 1.49; 9 G/1000ML; G/1000ML; G/1000ML
INJECTION, SOLUTION INTRAVENOUS CONTINUOUS
Status: DISCONTINUED | OUTPATIENT
Start: 2025-06-22 | End: 2025-06-26

## 2025-06-22 RX ADMIN — POTASSIUM CHLORIDE, DEXTROSE MONOHYDRATE AND SODIUM CHLORIDE: 150; 5; 900 INJECTION, SOLUTION INTRAVENOUS at 15:33

## 2025-06-22 RX ADMIN — MIRTAZAPINE 7.5 MG: 15 TABLET, FILM COATED ORAL at 21:12

## 2025-06-22 RX ADMIN — CARBIDOPA AND LEVODOPA 1 TABLET: 25; 100 TABLET ORAL at 09:02

## 2025-06-22 RX ADMIN — FINASTERIDE 5 MG: 5 TABLET, FILM COATED ORAL at 09:02

## 2025-06-22 RX ADMIN — NEOMYCIN SULFATE 500 MG: 500 TABLET ORAL at 08:59

## 2025-06-22 RX ADMIN — METOCLOPRAMIDE 10 MG: 10 TABLET ORAL at 12:14

## 2025-06-22 RX ADMIN — METRONIDAZOLE 500 MG: 250 TABLET ORAL at 17:12

## 2025-06-22 RX ADMIN — METRONIDAZOLE 500 MG: 250 TABLET ORAL at 09:01

## 2025-06-22 RX ADMIN — SODIUM CHLORIDE, PRESERVATIVE FREE 10 ML: 5 INJECTION INTRAVENOUS at 21:52

## 2025-06-22 RX ADMIN — POLYETHYLENE GLYCOL-3350 AND ELECTROLYTES 4000 ML: 236; 6.74; 5.86; 2.97; 22.74 POWDER, FOR SOLUTION ORAL at 12:14

## 2025-06-22 RX ADMIN — METOCLOPRAMIDE 10 MG: 10 TABLET ORAL at 06:27

## 2025-06-22 RX ADMIN — ACETAMINOPHEN 650 MG: 325 TABLET ORAL at 21:11

## 2025-06-22 RX ADMIN — METRONIDAZOLE 500 MG: 250 TABLET ORAL at 07:31

## 2025-06-22 RX ADMIN — LOSARTAN POTASSIUM 100 MG: 50 TABLET, FILM COATED ORAL at 09:01

## 2025-06-22 RX ADMIN — CARBIDOPA AND LEVODOPA 1 TABLET: 25; 100 TABLET ORAL at 21:12

## 2025-06-22 RX ADMIN — CARBIDOPA AND LEVODOPA 1 TABLET: 25; 100 TABLET ORAL at 13:15

## 2025-06-22 RX ADMIN — NEOMYCIN SULFATE 500 MG: 500 TABLET ORAL at 17:12

## 2025-06-22 RX ADMIN — NEOMYCIN SULFATE 500 MG: 500 TABLET ORAL at 07:31

## 2025-06-22 RX ADMIN — CARBIDOPA AND LEVODOPA 1 TABLET: 25; 100 TABLET ORAL at 17:12

## 2025-06-22 RX ADMIN — METOCLOPRAMIDE 10 MG: 10 TABLET ORAL at 17:12

## 2025-06-22 RX ADMIN — HYDROCHLOROTHIAZIDE 12.5 MG: 25 TABLET ORAL at 09:01

## 2025-06-22 NOTE — PROGRESS NOTES
Surgery Progress Note    6/22/2025    Admit Date: 6/19/2025  2:01 PM    CC: Confusion    Subjective:     Family at bedside.  Delirium continues.  Rectal tube came out by itself yesterday.  Having multiple BMs.    Constitutional: No fever or chills  Neurologic: No headache  Eyes: No scleral icterus or irritated eyes  Nose: No nasal pain or drainage  Mouth: No oral lesions or sore throat  Cardiac: No palpations or chest pain  Pulmonary: No cough or shortness of breath  Gastrointestinal: No nausea, emesis, diarrhea, or constipation  Genitourinary: No dysuria  Musculoskeletal: No muscle or joint tenderness  Skin: No rashes or lesions  Psychiatric: Confusion    Objective:     Vitals:    06/22/25 0551   BP:    Pulse: 65   Resp:    Temp:    SpO2:        General: No acute distress, conversant  Eyes: PERRLA, no scleral icterus  HENT: Normocephalic without oral lesions  Neck: Trachea midline without LAD  Cardiac: Normal pulse rate and rhythm  Pulmonary: Symmetric chest rise with normal effort  GI: Soft, NT, ND, no hernia, no splenomegaly  Skin: Warm without rash  Extremities: No edema or joint stiffness  Psych: GCS 14    Labs, vital signs, and I/O reviewed.    Assessment:     75-year-old male with sigmoid volvulus status post decompression    Plan:     Plan for surgery possibly tomorrow versus Tuesday.    Bowel prep today with p.o. antibiotics, Reglan, and GoLytely.  Clear liquids today.  N.p.o. after midnight    Dr. Gamboa to speak with the family tomorrow to confirm plan    Rory uDran MD, [facility], [facility]  Bariatric and General Surgeon  [facility]

## 2025-06-22 NOTE — PROGRESS NOTES
Hospitalist Progress Note  BYRON Nieves NP  Answering service: [phone] OR [phone] from in house phone        Date of Service:  2025  NAME:  Theron Sterling Jr.  :  1950  MRN:  [id number]      Admission Summary:     Theron Sterling Jr. is a 75 y.o. male with past medical history significant for dementia, Parkinson disease, hypertension initially presented at [facility] emergency room at [facility] with abdominal pain.  Patient symptoms started a couple of days ago and got worse overnight.  The pain is diffuse in location, pressure-like, no radiation, no known aggravating or relieving factors and patient is unable to state the severity of the pain.  Patient is also not able to provide good history because of his underlying dementia.  He also reported diarrhea without nausea and vomiting.  He has no record of a prior admission to this hospital.  CT scan of the abdomen and pelvis done in the emergency room showed sigmoid volvulus.  Patient was transferred to the emergency room at [facility] for urgent GI and general surgery consult.  He was subsequently referred to the hospitalist service for admission.          Interval history / Subjective:     Patient is s/p endoscopic decompression/reduction of sigmoid volvulus with Dr. Florian on Thursday afternoon. Rectal tube placed for decompressive efforts which came out .     The family is in agreement with surgery. Details to be finalized, but hopeful for surgery with Cooper tomorrow vs. Tuesday. Starting prep / pre op antibiotics today.      Noted to have glucose 77 with labs, switching IV fluids to D5 20K @ 100 ml/hr.    Ambulating well in the halls.     Assessment & Plan:     Sigmoid Volvulus   - reduced endoscopically with Chiqui on    - additional interventions include non operative 'watch and wait' vs. Elective sigmoid  of tests in the radiology section of CPT  Review and/or order of tests in the medicine section of CPT    I have independently reviewed and interpreted patient's lab and all other diagnostic data    Notes reviewed from all clinical/nonclinical/nursing services involved in patient's clinical care. Care coordination discussions were held with appropriate clinical/nonclinical/ nursing providers based on care coordination needs.     Labs:     Recent Labs     06/20/25  0328 06/21/25  0826   WBC 7.2 6.1   HGB 11.1* 12.2   HCT 31.7* 37.2    259     Recent Labs     06/20/25  0328 06/20/25  2342 06/21/25  0826 06/21/25  0828   *  --  133*  --    K 2.5* 2.8* 3.3* 3.5   CL 98  --  97  --    CO2 26  --  27  --    BUN 12  --  8  --    MG 2.0  --  1.8  --    PHOS 2.2*  --  2.5*  --      Recent Labs     06/20/25 0328   ALT 17   GLOB 2.7     No results for input(s): \"INR\", \"APTT\" in the last 72 hours.    Invalid input(s): \"PTP\"   No results for input(s): \"TIBC\" in the last 72 hours.    Invalid input(s): \"FE\", \"PSAT\", \"FERR\"   No results found for: \"RBCF\"   No results for input(s): \"PH\", \"PCO2\", \"PO2\" in the last 72 hours.  No results for input(s): \"CPK\" in the last 72 hours.    Invalid input(s): \"CPKMB\", \"CKNDX\", \"TROIQ\"  No results found for: \"CHOL\", \"CHLST\", \"CHOLV\", \"HDL\", \"HDLC\", \"LDL\", \"LDLC\"  No results found for: \"GLUCPOC\"        Medications Reviewed:     Current Facility-Administered Medications   Medication Dose Route Frequency    metoclopramide (REGLAN) tablet 10 mg  10 mg Oral Q6H    metroNIDAZOLE (FLAGYL) tablet 500 mg  500 mg Oral Once    neomycin (MYCIFRADIN) tablet 500 mg  500 mg Oral Once    carbidopa-levodopa (SINEMET)  MG per tablet 1 tablet  1 tablet Oral 4x Daily    mirtazapine (REMERON) tablet 7.5 mg  7.5 mg Oral Nightly    finasteride (PROSCAR) tablet 5 mg  5 mg Oral Daily    losartan (COZAAR) tablet 100 mg  100 mg Oral Daily    And    hydroCHLOROthiazide (HYDRODIURIL) tablet 12.5 mg  12.5

## 2025-06-22 NOTE — PLAN OF CARE
Problem: Pain  Goal: Verbalizes/displays adequate comfort level or baseline comfort level  Outcome: Progressing     Problem: Discharge Planning  Goal: Discharge to home or other facility with appropriate resources  Outcome: Progressing  Flowsheets (Taken 6/21/2025 2142 by Valeria Garza, RN)  Discharge to home or other facility with appropriate resources:   Identify barriers to discharge with patient and caregiver   Refer to discharge planning if patient needs post-hospital services based on physician order or complex needs related to functional status, cognitive ability or social support system     Problem: Skin/Tissue Integrity  Goal: Skin integrity remains intact  Description: 1.  Monitor for areas of redness and/or skin breakdown  2.  Assess vascular access sites hourly  3.  Every 4-6 hours minimum:  Change oxygen saturation probe site  4.  Every 4-6 hours:  If on nasal continuous positive airway pressure, respiratory therapy assess nares and determine need for appliance change or resting period  Outcome: Progressing  Flowsheets (Taken 6/21/2025 2142 by Valeria Garza, RN)  Skin Integrity Remains Intact:   Monitor for areas of redness and/or skin breakdown   Turn and reposition as indicated   Positioning devices   Pressure redistribution bed/mattress (bed type)

## 2025-06-23 ENCOUNTER — ANESTHESIA (OUTPATIENT)
Facility: HOSPITAL | Age: 75
End: 2025-06-23
Payer: COMMERCIAL

## 2025-06-23 ENCOUNTER — ANESTHESIA EVENT (OUTPATIENT)
Facility: HOSPITAL | Age: 75
End: 2025-06-23
Payer: COMMERCIAL

## 2025-06-23 LAB
ALBUMIN SERPL-MCNC: 2.8 G/DL (ref 3.5–5)
ALBUMIN/GLOB SERPL: 1.2 (ref 1.1–2.2)
ALP SERPL-CCNC: 58 U/L (ref 45–117)
ALT SERPL-CCNC: 8 U/L (ref 12–78)
ANION GAP SERPL CALC-SCNC: 8 MMOL/L (ref 2–12)
AST SERPL-CCNC: 22 U/L (ref 15–37)
BASOPHILS # BLD: 0.04 K/UL (ref 0–0.1)
BASOPHILS NFR BLD: 0.7 % (ref 0–1)
BILIRUB SERPL-MCNC: 0.6 MG/DL (ref 0.2–1)
BUN SERPL-MCNC: 4 MG/DL (ref 6–20)
BUN/CREAT SERPL: 4 (ref 12–20)
CALCIUM SERPL-MCNC: 8.5 MG/DL (ref 8.5–10.1)
CHLORIDE SERPL-SCNC: 101 MMOL/L (ref 97–108)
CO2 SERPL-SCNC: 29 MMOL/L (ref 21–32)
CREAT SERPL-MCNC: 0.93 MG/DL (ref 0.7–1.3)
DIFFERENTIAL METHOD BLD: ABNORMAL
EOSINOPHIL # BLD: 0.53 K/UL (ref 0–0.4)
EOSINOPHIL NFR BLD: 9.7 % (ref 0–7)
ERYTHROCYTE [DISTWIDTH] IN BLOOD BY AUTOMATED COUNT: 12.5 % (ref 11.5–14.5)
GLOBULIN SER CALC-MCNC: 2.3 G/DL (ref 2–4)
GLUCOSE SERPL-MCNC: 121 MG/DL (ref 65–100)
HCT VFR BLD AUTO: 32.2 % (ref 36.6–50.3)
HGB BLD-MCNC: 10.7 G/DL (ref 12.1–17)
IMM GRANULOCYTES # BLD AUTO: 0.02 K/UL (ref 0–0.04)
IMM GRANULOCYTES NFR BLD AUTO: 0.4 % (ref 0–0.5)
LYMPHOCYTES # BLD: 0.86 K/UL (ref 0.8–3.5)
LYMPHOCYTES NFR BLD: 15.8 % (ref 12–49)
MAGNESIUM SERPL-MCNC: 1.4 MG/DL (ref 1.6–2.4)
MCH RBC QN AUTO: 32.2 PG (ref 26–34)
MCHC RBC AUTO-ENTMCNC: 33.2 G/DL (ref 30–36.5)
MCV RBC AUTO: 97 FL (ref 80–99)
MONOCYTES # BLD: 0.55 K/UL (ref 0–1)
MONOCYTES NFR BLD: 10.1 % (ref 5–13)
NEUTS SEG # BLD: 3.46 K/UL (ref 1.8–8)
NEUTS SEG NFR BLD: 63.3 % (ref 32–75)
NRBC # BLD: 0 K/UL (ref 0–0.01)
NRBC BLD-RTO: 0 PER 100 WBC
PHOSPHATE SERPL-MCNC: 2.6 MG/DL (ref 2.6–4.7)
PLATELET # BLD AUTO: 238 K/UL (ref 150–400)
PMV BLD AUTO: 10.7 FL (ref 8.9–12.9)
POTASSIUM SERPL-SCNC: 2.8 MMOL/L (ref 3.5–5.1)
PROT SERPL-MCNC: 5.1 G/DL (ref 6.4–8.2)
RBC # BLD AUTO: 3.32 M/UL (ref 4.1–5.7)
SODIUM SERPL-SCNC: 138 MMOL/L (ref 136–145)
WBC # BLD AUTO: 5.5 K/UL (ref 4.1–11.1)

## 2025-06-23 PROCEDURE — 6360000002 HC RX W HCPCS: Performed by: SURGERY

## 2025-06-23 PROCEDURE — 84100 ASSAY OF PHOSPHORUS: CPT

## 2025-06-23 PROCEDURE — 2500000003 HC RX 250 WO HCPCS: Performed by: SURGERY

## 2025-06-23 PROCEDURE — 2500000003 HC RX 250 WO HCPCS: Performed by: NURSE ANESTHETIST, CERTIFIED REGISTERED

## 2025-06-23 PROCEDURE — 6370000000 HC RX 637 (ALT 250 FOR IP): Performed by: SURGERY

## 2025-06-23 PROCEDURE — 2580000003 HC RX 258: Performed by: NURSE ANESTHETIST, CERTIFIED REGISTERED

## 2025-06-23 PROCEDURE — 80053 COMPREHEN METABOLIC PANEL: CPT

## 2025-06-23 PROCEDURE — 6360000002 HC RX W HCPCS: Performed by: NURSE ANESTHETIST, CERTIFIED REGISTERED

## 2025-06-23 PROCEDURE — 0DJD8ZZ INSPECTION OF LOWER INTESTINAL TRACT, VIA NATURAL OR ARTIFICIAL OPENING ENDOSCOPIC: ICD-10-PCS | Performed by: SURGERY

## 2025-06-23 PROCEDURE — 99232 SBSQ HOSP IP/OBS MODERATE 35: CPT | Performed by: SURGERY

## 2025-06-23 PROCEDURE — 88305 TISSUE EXAM BY PATHOLOGIST: CPT

## 2025-06-23 PROCEDURE — 2709999900 HC NON-CHARGEABLE SUPPLY: Performed by: SURGERY

## 2025-06-23 PROCEDURE — 88307 TISSUE EXAM BY PATHOLOGIST: CPT

## 2025-06-23 PROCEDURE — 44207 L COLECTOMY/COLOPROCTOSTOMY: CPT | Performed by: SURGERY

## 2025-06-23 PROCEDURE — 83735 ASSAY OF MAGNESIUM: CPT

## 2025-06-23 PROCEDURE — 7100000001 HC PACU RECOVERY - ADDTL 15 MIN: Performed by: SURGERY

## 2025-06-23 PROCEDURE — 2500000003 HC RX 250 WO HCPCS: Performed by: NURSE PRACTITIONER

## 2025-06-23 PROCEDURE — 7100000000 HC PACU RECOVERY - FIRST 15 MIN: Performed by: SURGERY

## 2025-06-23 PROCEDURE — 0DTN4ZZ RESECTION OF SIGMOID COLON, PERCUTANEOUS ENDOSCOPIC APPROACH: ICD-10-PCS | Performed by: INTERNAL MEDICINE

## 2025-06-23 PROCEDURE — 6370000000 HC RX 637 (ALT 250 FOR IP): Performed by: NURSE PRACTITIONER

## 2025-06-23 PROCEDURE — 3600000004 HC SURGERY LEVEL 4 BASE: Performed by: SURGERY

## 2025-06-23 PROCEDURE — 2720000010 HC SURG SUPPLY STERILE: Performed by: SURGERY

## 2025-06-23 PROCEDURE — 44213 LAP MOBIL SPLENIC FL ADD-ON: CPT | Performed by: SURGERY

## 2025-06-23 PROCEDURE — 3700000001 HC ADD 15 MINUTES (ANESTHESIA): Performed by: SURGERY

## 2025-06-23 PROCEDURE — 3600000014 HC SURGERY LEVEL 4 ADDTL 15MIN: Performed by: SURGERY

## 2025-06-23 PROCEDURE — 3700000000 HC ANESTHESIA ATTENDED CARE: Performed by: SURGERY

## 2025-06-23 PROCEDURE — 85025 COMPLETE CBC W/AUTO DIFF WBC: CPT

## 2025-06-23 PROCEDURE — 2580000003 HC RX 258: Performed by: SURGERY

## 2025-06-23 PROCEDURE — 1200000000 HC SEMI PRIVATE

## 2025-06-23 PROCEDURE — C1762 CONN TISS, HUMAN(INC FASCIA): HCPCS | Performed by: SURGERY

## 2025-06-23 PROCEDURE — 2580000003 HC RX 258: Performed by: INTERNAL MEDICINE

## 2025-06-23 DEVICE — ALLOGRAFT HUM TISS 10X4 CM SHT AMNION/CHORION AMNIOFIX: Type: IMPLANTABLE DEVICE | Site: SIGMOID COLON | Status: FUNCTIONAL

## 2025-06-23 RX ORDER — HYDROMORPHONE HYDROCHLORIDE 1 MG/ML
0.5 INJECTION, SOLUTION INTRAMUSCULAR; INTRAVENOUS; SUBCUTANEOUS EVERY 5 MIN PRN
Status: DISCONTINUED | OUTPATIENT
Start: 2025-06-23 | End: 2025-06-23 | Stop reason: HOSPADM

## 2025-06-23 RX ORDER — ONDANSETRON 4 MG/1
4 TABLET, ORALLY DISINTEGRATING ORAL EVERY 8 HOURS PRN
Status: DISCONTINUED | OUTPATIENT
Start: 2025-06-23 | End: 2025-06-27 | Stop reason: HOSPADM

## 2025-06-23 RX ORDER — SODIUM CHLORIDE 9 MG/ML
INJECTION, SOLUTION INTRAVENOUS PRN
Status: DISCONTINUED | OUTPATIENT
Start: 2025-06-23 | End: 2025-06-23 | Stop reason: HOSPADM

## 2025-06-23 RX ORDER — BUPIVACAINE HYDROCHLORIDE 5 MG/ML
INJECTION, SOLUTION EPIDURAL; INTRACAUDAL; PERINEURAL PRN
Status: DISCONTINUED | OUTPATIENT
Start: 2025-06-23 | End: 2025-06-23 | Stop reason: HOSPADM

## 2025-06-23 RX ORDER — SODIUM CHLORIDE 0.9 % (FLUSH) 0.9 %
5-40 SYRINGE (ML) INJECTION EVERY 12 HOURS SCHEDULED
Status: DISCONTINUED | OUTPATIENT
Start: 2025-06-23 | End: 2025-06-23 | Stop reason: HOSPADM

## 2025-06-23 RX ORDER — ALVIMOPAN 12 MG/1
12 CAPSULE ORAL 2 TIMES DAILY
Status: DISCONTINUED | OUTPATIENT
Start: 2025-06-24 | End: 2025-06-27 | Stop reason: HOSPADM

## 2025-06-23 RX ORDER — ONDANSETRON 2 MG/ML
4 INJECTION INTRAMUSCULAR; INTRAVENOUS EVERY 6 HOURS PRN
Status: DISCONTINUED | OUTPATIENT
Start: 2025-06-23 | End: 2025-06-27 | Stop reason: HOSPADM

## 2025-06-23 RX ORDER — FENTANYL CITRATE 50 UG/ML
100 INJECTION, SOLUTION INTRAMUSCULAR; INTRAVENOUS
Status: DISCONTINUED | OUTPATIENT
Start: 2025-06-23 | End: 2025-06-23 | Stop reason: HOSPADM

## 2025-06-23 RX ORDER — OXYCODONE HYDROCHLORIDE 5 MG/1
5 TABLET ORAL EVERY 4 HOURS PRN
Status: DISCONTINUED | OUTPATIENT
Start: 2025-06-23 | End: 2025-06-25

## 2025-06-23 RX ORDER — LORAZEPAM 2 MG/ML
0.5 INJECTION INTRAMUSCULAR
Status: DISCONTINUED | OUTPATIENT
Start: 2025-06-23 | End: 2025-06-23 | Stop reason: HOSPADM

## 2025-06-23 RX ORDER — SODIUM CHLORIDE, SODIUM LACTATE, POTASSIUM CHLORIDE, CALCIUM CHLORIDE 600; 310; 30; 20 MG/100ML; MG/100ML; MG/100ML; MG/100ML
INJECTION, SOLUTION INTRAVENOUS CONTINUOUS
Status: DISCONTINUED | OUTPATIENT
Start: 2025-06-23 | End: 2025-06-23 | Stop reason: HOSPADM

## 2025-06-23 RX ORDER — LIDOCAINE HYDROCHLORIDE 10 MG/ML
1 INJECTION, SOLUTION EPIDURAL; INFILTRATION; INTRACAUDAL; PERINEURAL
Status: DISCONTINUED | OUTPATIENT
Start: 2025-06-23 | End: 2025-06-23 | Stop reason: HOSPADM

## 2025-06-23 RX ORDER — NALOXONE HYDROCHLORIDE 0.4 MG/ML
INJECTION, SOLUTION INTRAMUSCULAR; INTRAVENOUS; SUBCUTANEOUS PRN
Status: DISCONTINUED | OUTPATIENT
Start: 2025-06-23 | End: 2025-06-23 | Stop reason: HOSPADM

## 2025-06-23 RX ORDER — MAGNESIUM HYDROXIDE 1200 MG/15ML
LIQUID ORAL CONTINUOUS PRN
Status: DISCONTINUED | OUTPATIENT
Start: 2025-06-23 | End: 2025-06-23 | Stop reason: HOSPADM

## 2025-06-23 RX ORDER — LIDOCAINE HYDROCHLORIDE 20 MG/ML
INJECTION, SOLUTION EPIDURAL; INFILTRATION; INTRACAUDAL; PERINEURAL
Status: DISCONTINUED | OUTPATIENT
Start: 2025-06-23 | End: 2025-06-23 | Stop reason: SDUPTHER

## 2025-06-23 RX ORDER — BUPIVACAINE HYDROCHLORIDE 5 MG/ML
30 INJECTION, SOLUTION EPIDURAL; INTRACAUDAL ONCE
Status: DISCONTINUED | OUTPATIENT
Start: 2025-06-23 | End: 2025-06-23 | Stop reason: HOSPADM

## 2025-06-23 RX ORDER — IPRATROPIUM BROMIDE AND ALBUTEROL SULFATE 2.5; .5 MG/3ML; MG/3ML
1 SOLUTION RESPIRATORY (INHALATION)
Status: DISCONTINUED | OUTPATIENT
Start: 2025-06-23 | End: 2025-06-23 | Stop reason: HOSPADM

## 2025-06-23 RX ORDER — MAGNESIUM SULFATE IN WATER 40 MG/ML
2000 INJECTION, SOLUTION INTRAVENOUS ONCE
Status: COMPLETED | OUTPATIENT
Start: 2025-06-23 | End: 2025-06-23

## 2025-06-23 RX ORDER — ONDANSETRON 2 MG/ML
INJECTION INTRAMUSCULAR; INTRAVENOUS
Status: DISCONTINUED | OUTPATIENT
Start: 2025-06-23 | End: 2025-06-23 | Stop reason: SDUPTHER

## 2025-06-23 RX ORDER — DEXMEDETOMIDINE HYDROCHLORIDE 100 UG/ML
INJECTION, SOLUTION INTRAVENOUS
Status: DISCONTINUED | OUTPATIENT
Start: 2025-06-23 | End: 2025-06-23 | Stop reason: SDUPTHER

## 2025-06-23 RX ORDER — OXYCODONE HYDROCHLORIDE 5 MG/1
5 TABLET ORAL
Status: DISCONTINUED | OUTPATIENT
Start: 2025-06-23 | End: 2025-06-23 | Stop reason: HOSPADM

## 2025-06-23 RX ORDER — MIDAZOLAM HYDROCHLORIDE 2 MG/2ML
2 INJECTION, SOLUTION INTRAMUSCULAR; INTRAVENOUS AS NEEDED
Status: DISCONTINUED | OUTPATIENT
Start: 2025-06-23 | End: 2025-06-23 | Stop reason: HOSPADM

## 2025-06-23 RX ORDER — ROCURONIUM BROMIDE 10 MG/ML
INJECTION, SOLUTION INTRAVENOUS
Status: DISCONTINUED | OUTPATIENT
Start: 2025-06-23 | End: 2025-06-23 | Stop reason: SDUPTHER

## 2025-06-23 RX ORDER — PROCHLORPERAZINE EDISYLATE 5 MG/ML
5 INJECTION INTRAMUSCULAR; INTRAVENOUS
Status: DISCONTINUED | OUTPATIENT
Start: 2025-06-23 | End: 2025-06-23 | Stop reason: HOSPADM

## 2025-06-23 RX ORDER — DIPHENHYDRAMINE HYDROCHLORIDE 50 MG/ML
12.5 INJECTION, SOLUTION INTRAMUSCULAR; INTRAVENOUS
Status: DISCONTINUED | OUTPATIENT
Start: 2025-06-23 | End: 2025-06-23 | Stop reason: HOSPADM

## 2025-06-23 RX ORDER — ONDANSETRON 2 MG/ML
4 INJECTION INTRAMUSCULAR; INTRAVENOUS
Status: DISCONTINUED | OUTPATIENT
Start: 2025-06-23 | End: 2025-06-23 | Stop reason: HOSPADM

## 2025-06-23 RX ORDER — SODIUM CHLORIDE 0.9 % (FLUSH) 0.9 %
5-40 SYRINGE (ML) INJECTION PRN
Status: DISCONTINUED | OUTPATIENT
Start: 2025-06-23 | End: 2025-06-23 | Stop reason: HOSPADM

## 2025-06-23 RX ORDER — SODIUM CHLORIDE 0.9 % (FLUSH) 0.9 %
5-40 SYRINGE (ML) INJECTION EVERY 12 HOURS SCHEDULED
Status: DISCONTINUED | OUTPATIENT
Start: 2025-06-23 | End: 2025-06-27 | Stop reason: HOSPADM

## 2025-06-23 RX ORDER — ACETAMINOPHEN 325 MG/1
650 TABLET ORAL EVERY 6 HOURS
Status: DISCONTINUED | OUTPATIENT
Start: 2025-06-23 | End: 2025-06-27 | Stop reason: HOSPADM

## 2025-06-23 RX ORDER — HYDRALAZINE HYDROCHLORIDE 20 MG/ML
10 INJECTION INTRAMUSCULAR; INTRAVENOUS
Status: DISCONTINUED | OUTPATIENT
Start: 2025-06-23 | End: 2025-06-23 | Stop reason: HOSPADM

## 2025-06-23 RX ORDER — PHENYLEPHRINE HCL IN 0.9% NACL 0.4MG/10ML
SYRINGE (ML) INTRAVENOUS
Status: DISCONTINUED | OUTPATIENT
Start: 2025-06-23 | End: 2025-06-23 | Stop reason: SDUPTHER

## 2025-06-23 RX ORDER — CEFOXITIN 2 G/1
INJECTION, POWDER, FOR SOLUTION INTRAVENOUS
Status: DISCONTINUED | OUTPATIENT
Start: 2025-06-23 | End: 2025-06-23 | Stop reason: SDUPTHER

## 2025-06-23 RX ORDER — SODIUM CHLORIDE, SODIUM LACTATE, POTASSIUM CHLORIDE, CALCIUM CHLORIDE 600; 310; 30; 20 MG/100ML; MG/100ML; MG/100ML; MG/100ML
INJECTION, SOLUTION INTRAVENOUS
Status: DISCONTINUED | OUTPATIENT
Start: 2025-06-23 | End: 2025-06-23 | Stop reason: SDUPTHER

## 2025-06-23 RX ORDER — FENTANYL CITRATE 50 UG/ML
INJECTION, SOLUTION INTRAMUSCULAR; INTRAVENOUS
Status: DISCONTINUED | OUTPATIENT
Start: 2025-06-23 | End: 2025-06-23 | Stop reason: SDUPTHER

## 2025-06-23 RX ORDER — SUCCINYLCHOLINE/SOD CL,ISO/PF 200MG/10ML
SYRINGE (ML) INTRAVENOUS
Status: DISCONTINUED | OUTPATIENT
Start: 2025-06-23 | End: 2025-06-23 | Stop reason: SDUPTHER

## 2025-06-23 RX ORDER — FENTANYL CITRATE 50 UG/ML
25 INJECTION, SOLUTION INTRAMUSCULAR; INTRAVENOUS EVERY 5 MIN PRN
Status: DISCONTINUED | OUTPATIENT
Start: 2025-06-23 | End: 2025-06-23 | Stop reason: HOSPADM

## 2025-06-23 RX ORDER — PROPOFOL 10 MG/ML
INJECTION, EMULSION INTRAVENOUS
Status: DISCONTINUED | OUTPATIENT
Start: 2025-06-23 | End: 2025-06-23 | Stop reason: SDUPTHER

## 2025-06-23 RX ORDER — ACETAMINOPHEN 500 MG
1000 TABLET ORAL ONCE
Status: DISCONTINUED | OUTPATIENT
Start: 2025-06-23 | End: 2025-06-23 | Stop reason: HOSPADM

## 2025-06-23 RX ORDER — SODIUM CHLORIDE 9 MG/ML
INJECTION, SOLUTION INTRAVENOUS PRN
Status: DISCONTINUED | OUTPATIENT
Start: 2025-06-23 | End: 2025-06-27 | Stop reason: HOSPADM

## 2025-06-23 RX ORDER — SODIUM CHLORIDE 0.9 % (FLUSH) 0.9 %
5-40 SYRINGE (ML) INJECTION PRN
Status: DISCONTINUED | OUTPATIENT
Start: 2025-06-23 | End: 2025-06-27 | Stop reason: HOSPADM

## 2025-06-23 RX ADMIN — Medication 40 MCG: at 15:04

## 2025-06-23 RX ADMIN — CEFOXITIN SODIUM 2000 MG: 2 POWDER, FOR SOLUTION INTRAVENOUS at 13:57

## 2025-06-23 RX ADMIN — ROCURONIUM BROMIDE 50 MG: 10 INJECTION, SOLUTION INTRAVENOUS at 13:49

## 2025-06-23 RX ADMIN — ACETAMINOPHEN 650 MG: 325 TABLET ORAL at 21:02

## 2025-06-23 RX ADMIN — FENTANYL CITRATE 50 MCG: 50 INJECTION INTRAMUSCULAR; INTRAVENOUS at 13:45

## 2025-06-23 RX ADMIN — PROPOFOL 50 MG: 10 INJECTION, EMULSION INTRAVENOUS at 13:46

## 2025-06-23 RX ADMIN — HYDROMORPHONE HYDROCHLORIDE 0.5 MG: 1 INJECTION, SOLUTION INTRAMUSCULAR; INTRAVENOUS; SUBCUTANEOUS at 16:58

## 2025-06-23 RX ADMIN — DEXMEDETOMIDINE 4 MCG: 100 INJECTION, SOLUTION INTRAVENOUS at 17:12

## 2025-06-23 RX ADMIN — SODIUM CHLORIDE 25 ML: 0.9 INJECTION, SOLUTION INTRAVENOUS at 09:38

## 2025-06-23 RX ADMIN — PHENYLEPHRINE HYDROCHLORIDE 40 MCG/MIN: 10 INJECTION INTRAVENOUS at 14:05

## 2025-06-23 RX ADMIN — SODIUM CHLORIDE, POTASSIUM CHLORIDE, SODIUM LACTATE AND CALCIUM CHLORIDE: 600; 310; 30; 20 INJECTION, SOLUTION INTRAVENOUS at 13:30

## 2025-06-23 RX ADMIN — PROPOFOL 100 MG: 10 INJECTION, EMULSION INTRAVENOUS at 13:45

## 2025-06-23 RX ADMIN — Medication 40 MCG: at 16:03

## 2025-06-23 RX ADMIN — LIDOCAINE HYDROCHLORIDE 80 MG: 20 INJECTION, SOLUTION EPIDURAL; INFILTRATION; INTRACAUDAL; PERINEURAL at 13:45

## 2025-06-23 RX ADMIN — Medication 40 MCG: at 15:18

## 2025-06-23 RX ADMIN — ONDANSETRON 4 MG: 2 INJECTION INTRAMUSCULAR; INTRAVENOUS at 16:53

## 2025-06-23 RX ADMIN — SODIUM CHLORIDE, PRESERVATIVE FREE 10 ML: 5 INJECTION INTRAVENOUS at 21:03

## 2025-06-23 RX ADMIN — LOSARTAN POTASSIUM 100 MG: 50 TABLET, FILM COATED ORAL at 09:43

## 2025-06-23 RX ADMIN — POTASSIUM CHLORIDE, DEXTROSE MONOHYDRATE AND SODIUM CHLORIDE: 150; 5; 900 INJECTION, SOLUTION INTRAVENOUS at 18:44

## 2025-06-23 RX ADMIN — DEXMEDETOMIDINE 8 MCG: 100 INJECTION, SOLUTION INTRAVENOUS at 16:58

## 2025-06-23 RX ADMIN — Medication 80 MCG: at 14:04

## 2025-06-23 RX ADMIN — POTASSIUM PHOSPHATE, MONOBASIC AND POTASSIUM PHOSPHATE, DIBASIC 30 MMOL: 224; 236 INJECTION, SOLUTION, CONCENTRATE INTRAVENOUS at 10:49

## 2025-06-23 RX ADMIN — HYDROCHLOROTHIAZIDE 12.5 MG: 25 TABLET ORAL at 09:43

## 2025-06-23 RX ADMIN — ROCURONIUM BROMIDE 50 MG: 10 INJECTION, SOLUTION INTRAVENOUS at 14:09

## 2025-06-23 RX ADMIN — SUGAMMADEX 200 MG: 100 INJECTION, SOLUTION INTRAVENOUS at 16:53

## 2025-06-23 RX ADMIN — MIRTAZAPINE 7.5 MG: 15 TABLET, FILM COATED ORAL at 21:03

## 2025-06-23 RX ADMIN — FENTANYL CITRATE 50 MCG: 50 INJECTION INTRAMUSCULAR; INTRAVENOUS at 14:22

## 2025-06-23 RX ADMIN — SODIUM CHLORIDE 25 ML: 0.9 INJECTION, SOLUTION INTRAVENOUS at 10:49

## 2025-06-23 RX ADMIN — Medication 80 MCG: at 15:22

## 2025-06-23 RX ADMIN — CARBIDOPA AND LEVODOPA 1 TABLET: 25; 100 TABLET ORAL at 09:42

## 2025-06-23 RX ADMIN — POTASSIUM CHLORIDE, DEXTROSE MONOHYDRATE AND SODIUM CHLORIDE: 150; 5; 900 INJECTION, SOLUTION INTRAVENOUS at 02:49

## 2025-06-23 RX ADMIN — Medication 60 MG: at 13:46

## 2025-06-23 RX ADMIN — ROCURONIUM BROMIDE 30 MG: 10 INJECTION, SOLUTION INTRAVENOUS at 15:50

## 2025-06-23 RX ADMIN — DEXMEDETOMIDINE 8 MCG: 100 INJECTION, SOLUTION INTRAVENOUS at 17:07

## 2025-06-23 RX ADMIN — FINASTERIDE 5 MG: 5 TABLET, FILM COATED ORAL at 09:45

## 2025-06-23 RX ADMIN — SODIUM CHLORIDE, POTASSIUM CHLORIDE, SODIUM LACTATE AND CALCIUM CHLORIDE: 600; 310; 30; 20 INJECTION, SOLUTION INTRAVENOUS at 16:58

## 2025-06-23 RX ADMIN — HYDROMORPHONE HYDROCHLORIDE 0.5 MG: 1 INJECTION, SOLUTION INTRAMUSCULAR; INTRAVENOUS; SUBCUTANEOUS at 16:47

## 2025-06-23 RX ADMIN — Medication 40 MCG: at 16:12

## 2025-06-23 RX ADMIN — MAGNESIUM SULFATE HEPTAHYDRATE 2000 MG: 40 INJECTION, SOLUTION INTRAVENOUS at 09:39

## 2025-06-23 RX ADMIN — CARBIDOPA AND LEVODOPA 1 TABLET: 25; 100 TABLET ORAL at 21:03

## 2025-06-23 RX ADMIN — POTASSIUM BICARBONATE 40 MEQ: 782 TABLET, EFFERVESCENT ORAL at 09:41

## 2025-06-23 ASSESSMENT — PAIN - FUNCTIONAL ASSESSMENT: PAIN_FUNCTIONAL_ASSESSMENT: 0-10

## 2025-06-23 NOTE — ANESTHESIA PRE PROCEDURE
Department of Anesthesiology  Preprocedure Note       Name:  Theron Sterling Jr.   Age:  75 y.o.  :  1950                                          MRN:  [id number]         Date:  2025      Surgeon: Surgeon(s):  Phil Gamboa MD    Procedure: Procedure(s):  LAPAROSCOPIC SIGMOID RESECTION WITH RIGID PROCTOSCOPY    Medications prior to admission:   Prior to Admission medications    Medication Sig Start Date End Date Taking? Authorizing Provider   carbidopa-levodopa (SINEMET)  MG per tablet Take 1 tablet by mouth 4 times daily 25  Yes Vignesh Chisholm DO   docusate (COLACE, DULCOLAX) 100 MG CAPS Take 100 mg by mouth as needed 22  Yes Provider, MD Jose   dutasteride (AVODART) 0.5 MG capsule Take 1 capsule by mouth daily   Yes Provider, MD Jose   esomeprazole (NEXIUM) 40 MG delayed release capsule Take 1 capsule by mouth daily 22  Yes Provider, MD Jose   mirtazapine (REMERON) 7.5 MG tablet Take 1 tablet by mouth at bedtime 22  Yes Provider, Historical, MD   telmisartan-hydroCHLOROthiazide (MICARDIS HCT) 80-12.5 MG per tablet Take by mouth in the morning and at bedtime 6/3/22  Yes Provider, MD Jose   donepezil (ARICEPT) 5 MG tablet Take 1 tablet by mouth nightly 25   Vignesh Chisholm DO       Current medications:    Current Facility-Administered Medications   Medication Dose Route Frequency Provider Last Rate Last Admin    potassium phosphate 30 mmol in sodium chloride 0.9 % 500 mL IVPB  30 mmol IntraVENous Once Phil Gamboa MD 83.3 mL/hr at 25 1049 30 mmol at 25 1049    dextrose 5 % and 0.9 % NaCl with KCl 20 mEq infusion   IntraVENous Continuous Jessie Dalton APRN -  mL/hr at 25 0249 New Bag at 25 0249    carbidopa-levodopa (SINEMET)  MG per tablet 1 tablet  1 tablet Oral 4x Daily Jessie Dalton APRN - NP   1 tablet at 25 0942    mirtazapine (REMERON) tablet 7.5 mg  7.5 mg Oral Nightly Sha

## 2025-06-23 NOTE — ANESTHESIA POSTPROCEDURE EVALUATION
Department of Anesthesiology  Postprocedure Note    Patient: Theron Sterling Jr.  MRN: [id number]  YOB: 1950  Date of evaluation: 6/23/2025    Procedure Summary       Date: 06/23/25 Room / Location: [facility] MAIN OR  / [facility] MAIN OR    Anesthesia Start: 1330 Anesthesia Stop: 1723    Procedure: LAPAROSCOPIC SIGMOID RESECTION WITH RIGID PROCTOSCOPY (Abdomen/Perineum) Diagnosis:       Sigmoid volvulus (HCC)      (Sigmoid volvulus (HCC) [K56.2])    Providers: Phil Gamboa MD Responsible Provider: Zeke Carballo MD    Anesthesia Type: general ASA Status: 2            Anesthesia Type: No value filed.    Manny Phase I: Manny Score: 10    Manny Phase II: Manny Score: 10    Anesthesia Post Evaluation    Patient location during evaluation: PACU  Patient participation: complete - patient participated  Level of consciousness: awake  Airway patency: patent  Nausea & Vomiting: no nausea  Cardiovascular status: hemodynamically stable  Respiratory status: acceptable  Hydration status: stable  Pain management: adequate    No notable events documented.

## 2025-06-23 NOTE — PROGRESS NOTES
End of Shift Note    Bedside shift change report given to MITZY SALMON (oncoming nurse) by BE CAMPBELL, RN (offgoing nurse).  Report included the following information SBAR, Kardex, Intake/Output, MAR, and Recent Results    Shift worked:  1930 to 0830     Shift summary and any significant changes:     Patient completed his colonoscopy prep, whole 4000 mls tolerated, stools clear     Concerns for physician to address:  Potassium levels low, orders to replete levels placed by Surgeon Dr Cooper George phone for oncoming shift:  [phone]       Activity:  Level of Assistance: Moderate assist, patient does 50-74%  Number times ambulated in hallways past shift: 1  Number of times OOB to chair past shift: 3    Cardiac:   Cardiac Monitoring: Yes      Cardiac Rhythm: Sinus rhythm    Access:  Current line(s): PIV     Genitourinary:   Urinary Status: Voiding    Respiratory:   O2 Device: None (Room air)  Chronic home O2 use?: N/A  Incentive spirometer at bedside: YES    GI:    Current diet:  Diet NPO  Passing flatus: YES    Pain Management:   Patient states pain is manageable on current regimen: YES    Skin:  Kervin Scale Score: 19  Interventions: Wound Offloading (Prevention Methods): Bed, pressure reduction mattress, Elevate heels, Pillows, Repositioning, Turning    Patient Safety:  Fall Risk: Nursing Judgement-Fall Risk High(Add Comments): Yes  Fall Risk Interventions  Nursing Judgement-Fall Risk High(Add Comments): Yes  Toilet Every 2 Hours-In Advance of Need: Yes  Hourly Visual Checks: Awake, Quiet, In bed  Fall Visual Posted: Armband, Socks, Fall sign posted  Room Door Open: Deferred to decrease stimulation  Alarm On: Bed  Patient Moved Closer to Nursing Station: Yes    Active Consults:   IP CONSULT TO GI  IP CONSULT TO HOSPITALIST    Length of Stay:  Expected LOS: 3  Actual LOS: 4    BE CAMPBELL, RN

## 2025-06-23 NOTE — BRIEF OP NOTE
Brief Postoperative Note      Patient: Theron Sterling Jr.  YOB: 1950  MRN: [id number]    Date of Procedure: 6/23/2025    Pre-Op Diagnosis Codes:      * Sigmoid volvulus (HCC) [K56.2]    Post-Op Diagnosis: Same       Procedure(s):  LAPAROSCOPIC SIGMOID RESECTION WITH RIGID PROCTOSCOPY; SPLENIC FLEXURE MOBILIZATION    Surgeon(s):  Phil Gamboa MD    Assistant:  Surgical Assistant: Cindi Waters    Anesthesia: General    Estimated Blood Loss (mL): 150    Complications: None    Specimens:   ID Type Source Tests Collected by Time Destination   1 : sigmoid colon Tissue Sigmoid Colon SURGICAL PATHOLOGY Phil Gamboa MD 6/23/2025 1502    2 : anastomotic rings Tissue Recto sigmoid SURGICAL PATHOLOGY Phil Gamboa MD 6/23/2025 1629        Implants:  Implant Name Type Inv. Item Serial No.  Lot No. LRB No. Used Action   ALLOGRAFT HUM TISS 10X4 CM SHT AMNION/CHORION AMNIOFIX - [id number]  ALLOGRAFT HUM TISS 10X4 CM SHT AMNION/CHORION AMNIOFIX [id number] [employer]-  N/A 1 Implanted         Drains:   Closed/Suction Drain Right Abdomen Bulb (Active)   Site Description Clean, dry & intact 06/23/25 1704   Dressing Status Clean, dry & intact 06/23/25 1730   Drainage Appearance Serosanguinous 06/23/25 1730   Drain Status Compressed 06/23/25 1730       Urinary Catheter 06/23/25 2 Way (Active)   Catheter Indications Perioperative use for selected surgical procedures 06/23/25 1730   Site Assessment No urethral drainage 06/23/25 1730   Urine Color Yellow 06/23/25 1730   Urine Appearance Clear 06/23/25 1730   Collection Container Standard 06/23/25 1730   Securement Method Securing device (Describe) 06/23/25 1730   Catheter Best Practices  Drainage tube clipped to bed;Catheter secured to thigh;Tamper seal intact;Bag below bladder;Bag not on floor;Lack of dependent loop in tubing;Drainage bag less than half full 06/23/25 1730   Status Draining 06/23/25 1730       [REMOVED]

## 2025-06-23 NOTE — PROGRESS NOTES
1845 VS stable. Awake and alert. Resting comfortably. Patient denies nausea. No signs of excessive bleeding. Ready to transfer from PACU.    (2) cough or sneeze

## 2025-06-23 NOTE — PLAN OF CARE
Problem: Pain  Goal: Verbalizes/displays adequate comfort level or baseline comfort level  Outcome: Progressing     Problem: Safety - Adult  Goal: Free from fall injury  Outcome: Progressing     Problem: Skin/Tissue Integrity  Goal: Skin integrity remains intact  Description: 1.  Monitor for areas of redness and/or skin breakdown  2.  Assess vascular access sites hourly  3.  Every 4-6 hours minimum:  Change oxygen saturation probe site  4.  Every 4-6 hours:  If on nasal continuous positive airway pressure, respiratory therapy assess nares and determine need for appliance change or resting period  Outcome: Progressing  Flowsheets (Taken 6/23/2025 3183)  Skin Integrity Remains Intact: Monitor for areas of redness and/or skin breakdown

## 2025-06-23 NOTE — PROGRESS NOTES
Department of General Surgery - Adult  Surgical Service   Attending Progress Note      SUBJECTIVE:  Denies abdominal pain; he completed bowel prep late yesterday.  No nausea or vomiting.    OBJECTIVE      Physical    VITALS:  /71   Pulse 82   Temp 98.1 °F (36.7 °C) (Axillary)   Resp 16   Wt 85.3 kg (188 lb 0.8 oz)   SpO2 97%   BMI 24.14 kg/m²   INTAKE/OUTPUT:    Intake/Output Summary (Last 24 hours) at 6/23/2025 0841  Last data filed at 6/22/2025 0845  Gross per 24 hour   Intake 150 ml   Output --   Net 150 ml     ABDOMEN:  Non-distended, soft, non-tender.    Data  CBC with Differential:    Lab Results   Component Value Date/Time    WBC 5.5 06/23/2025 03:45 AM    RBC 3.32 06/23/2025 03:45 AM    HGB 10.7 06/23/2025 03:45 AM    HCT 32.2 06/23/2025 03:45 AM     06/23/2025 03:45 AM    MCV 97.0 06/23/2025 03:45 AM    MCH 32.2 06/23/2025 03:45 AM    MCHC 33.2 06/23/2025 03:45 AM    RDW 12.5 06/23/2025 03:45 AM    NRBC 0.0 06/23/2025 03:45 AM    NRBC 0.00 06/23/2025 03:45 AM    LYMPHOPCT 15.8 06/23/2025 03:45 AM    MONOPCT 10.1 06/23/2025 03:45 AM    EOSPCT 9.7 06/23/2025 03:45 AM    BASOPCT 0.7 06/23/2025 03:45 AM    MONOSABS 0.55 06/23/2025 03:45 AM    LYMPHSABS 0.86 06/23/2025 03:45 AM    EOSABS 0.53 06/23/2025 03:45 AM    BASOSABS 0.04 06/23/2025 03:45 AM    DIFFTYPE AUTOMATED 06/23/2025 03:45 AM     CMP:    Lab Results   Component Value Date/Time     06/23/2025 03:45 AM    K 2.8 06/23/2025 03:45 AM     06/23/2025 03:45 AM    CO2 29 06/23/2025 03:45 AM    BUN 4 06/23/2025 03:45 AM    CREATININE 0.93 06/23/2025 03:45 AM    GFRAA >60 10/01/2019 03:47 PM    AGRATIO 1.2 10/01/2019 03:47 PM    LABGLOM 86 06/23/2025 03:45 AM    GLUCOSE 121 06/23/2025 03:45 AM    CALCIUM 8.5 06/23/2025 03:45 AM    BILITOT 0.6 06/23/2025 03:45 AM    ALKPHOS 58 06/23/2025 03:45 AM    ALKPHOS 58 10/01/2019 03:47 PM    AST 22 06/23/2025 03:45 AM    ALT 8 06/23/2025 03:45 AM     BMP:    Lab Results   Component Value

## 2025-06-23 NOTE — PROGRESS NOTES
Hospitalist Progress Note  BYRON Nieves NP  Answering service: [phone] OR [phone] from in house phone        Date of Service:  2025  NAME:  Theron Sterling Jr.  :  1950  MRN:  [id number]      Admission Summary:     Theron Sterling Jr. is a 75 y.o. male with past medical history significant for dementia, Parkinson disease, hypertension initially presented at [facility] emergency room at [facility] with abdominal pain.  Patient symptoms started a couple of days ago and got worse overnight.  The pain is diffuse in location, pressure-like, no radiation, no known aggravating or relieving factors and patient is unable to state the severity of the pain.  Patient is also not able to provide good history because of his underlying dementia.  He also reported diarrhea without nausea and vomiting.  He has no record of a prior admission to this hospital.  CT scan of the abdomen and pelvis done in the emergency room showed sigmoid volvulus.  Patient was transferred to the emergency room at [facility] for urgent GI and general surgery consult.  He was subsequently referred to the hospitalist service for admission.          Interval history / Subjective:     Patient to have colon resection with Dr. Gamboa today. Completed bowel prep successfully yesterday. Seen and examined sitting up in the chair today. No acute complaints.      Assessment & Plan:     Sigmoid Volvulus   - reduced endoscopically with Chiqui on    - additional interventions include non operative 'watch and wait' vs. Elective sigmoid resection per Dr. Gamboa   - rectal tube was pulled on accident over the weekend, leave out   - patient to go to surgery today with Dr. Gamboa, may move to 5E following surgery     Electrolyte Disturbances   - hypomagnesemia/hyponatremia/hyponatremia   - likely related to volume depletion   -  reviewed from all clinical/nonclinical/nursing services involved in patient's clinical care. Care coordination discussions were held with appropriate clinical/nonclinical/ nursing providers based on care coordination needs.     Labs:     Recent Labs     06/21/25  0826 06/23/25  0345   WBC 6.1 5.5   HGB 12.2 10.7*   HCT 37.2 32.2*    238     Recent Labs     06/21/25  0826 06/21/25  0828 06/23/25  0345   *  --  138   K 3.3* 3.5 2.8*   CL 97  --  101   CO2 27  --  29   BUN 8  --  4*   MG 1.8  --  1.4*   PHOS 2.5*  --  2.6     Recent Labs     06/23/25  0345   ALT 8*   GLOB 2.3     No results for input(s): \"INR\", \"APTT\" in the last 72 hours.    Invalid input(s): \"PTP\"   No results for input(s): \"TIBC\" in the last 72 hours.    Invalid input(s): \"FE\", \"PSAT\", \"FERR\"   No results found for: \"RBCF\"   No results for input(s): \"PH\", \"PCO2\", \"PO2\" in the last 72 hours.  No results for input(s): \"CPK\" in the last 72 hours.    Invalid input(s): \"CPKMB\", \"CKNDX\", \"TROIQ\"  No results found for: \"CHOL\", \"CHLST\", \"CHOLV\", \"HDL\", \"HDLC\", \"LDL\", \"LDLC\"  No results found for: \"GLUCPOC\"        Medications Reviewed:     Current Facility-Administered Medications   Medication Dose Route Frequency    potassium phosphate 30 mmol in sodium chloride 0.9 % 500 mL IVPB  30 mmol IntraVENous Once    sod chloride IRR soln 0.9 % irrigation    Continuous PRN    dextrose 5 % and 0.9 % NaCl with KCl 20 mEq infusion   IntraVENous Continuous    carbidopa-levodopa (SINEMET)  MG per tablet 1 tablet  1 tablet Oral 4x Daily    mirtazapine (REMERON) tablet 7.5 mg  7.5 mg Oral Nightly    finasteride (PROSCAR) tablet 5 mg  5 mg Oral Daily    losartan (COZAAR) tablet 100 mg  100 mg Oral Daily    And    hydroCHLOROthiazide (HYDRODIURIL) tablet 12.5 mg  12.5 mg Oral Daily    sodium chloride flush 0.9 % injection 5-40 mL  5-40 mL IntraVENous 2 times per day    sodium chloride flush 0.9 % injection 5-40 mL  5-40 mL IntraVENous PRN    0.9 % sodium

## 2025-06-23 NOTE — FLOWSHEET NOTE
1910 TRANSFER - OUT REPORT:    Verbal report given to Rosalind(name) on Theron Sterling Jr.  being transferred to [facility](unit) for routine post-op       Report consisted of patient’s Situation, Background, Assessment and   Recommendations(SBAR).     Information from the following report(s) OR Summary, Intake/Output, MAR, and Cardiac Rhythm SR was reviewed with the receiving nurse.    Opportunity for questions and clarification was provided.     Is the patient on 02? No    Is the patient on a monitor? No    Is the nurse transporting with the patient? No    Surgical Waiting Area notified of patient's transfer from PACU? Yes

## 2025-06-24 LAB
ANION GAP SERPL CALC-SCNC: 6 MMOL/L (ref 2–12)
BASOPHILS # BLD: 0.01 K/UL (ref 0–0.1)
BASOPHILS NFR BLD: 0.1 % (ref 0–1)
BUN SERPL-MCNC: 6 MG/DL (ref 6–20)
BUN/CREAT SERPL: 6 (ref 12–20)
CALCIUM SERPL-MCNC: 8.3 MG/DL (ref 8.5–10.1)
CHLORIDE SERPL-SCNC: 105 MMOL/L (ref 97–108)
CO2 SERPL-SCNC: 28 MMOL/L (ref 21–32)
CREAT SERPL-MCNC: 1.02 MG/DL (ref 0.7–1.3)
DIFFERENTIAL METHOD BLD: ABNORMAL
EOSINOPHIL # BLD: 0 K/UL (ref 0–0.4)
EOSINOPHIL NFR BLD: 0 % (ref 0–7)
ERYTHROCYTE [DISTWIDTH] IN BLOOD BY AUTOMATED COUNT: 13.2 % (ref 11.5–14.5)
GLUCOSE SERPL-MCNC: 154 MG/DL (ref 65–100)
HCT VFR BLD AUTO: 31.2 % (ref 36.6–50.3)
HGB BLD-MCNC: 10.9 G/DL (ref 12.1–17)
IMM GRANULOCYTES # BLD AUTO: 0.02 K/UL (ref 0–0.04)
IMM GRANULOCYTES NFR BLD AUTO: 0.2 % (ref 0–0.5)
LYMPHOCYTES # BLD: 0.3 K/UL (ref 0.8–3.5)
LYMPHOCYTES NFR BLD: 3 % (ref 12–49)
MAGNESIUM SERPL-MCNC: 1.8 MG/DL (ref 1.6–2.4)
MCH RBC QN AUTO: 32.9 PG (ref 26–34)
MCHC RBC AUTO-ENTMCNC: 34.9 G/DL (ref 30–36.5)
MCV RBC AUTO: 94.3 FL (ref 80–99)
MONOCYTES # BLD: 0.71 K/UL (ref 0–1)
MONOCYTES NFR BLD: 7 % (ref 5–13)
NEUTS SEG # BLD: 9.04 K/UL (ref 1.8–8)
NEUTS SEG NFR BLD: 89.7 % (ref 32–75)
NRBC # BLD: 0 K/UL (ref 0–0.01)
NRBC BLD-RTO: 0 PER 100 WBC
PHOSPHATE SERPL-MCNC: 3.5 MG/DL (ref 2.6–4.7)
PLATELET # BLD AUTO: 223 K/UL (ref 150–400)
PMV BLD AUTO: 10.8 FL (ref 8.9–12.9)
POTASSIUM SERPL-SCNC: 3.3 MMOL/L (ref 3.5–5.1)
RBC # BLD AUTO: 3.31 M/UL (ref 4.1–5.7)
SODIUM SERPL-SCNC: 139 MMOL/L (ref 136–145)
WBC # BLD AUTO: 10.1 K/UL (ref 4.1–11.1)

## 2025-06-24 PROCEDURE — 6370000000 HC RX 637 (ALT 250 FOR IP): Performed by: SURGERY

## 2025-06-24 PROCEDURE — 83735 ASSAY OF MAGNESIUM: CPT

## 2025-06-24 PROCEDURE — 2500000003 HC RX 250 WO HCPCS: Performed by: SURGERY

## 2025-06-24 PROCEDURE — 6360000002 HC RX W HCPCS: Performed by: SURGERY

## 2025-06-24 PROCEDURE — 97161 PT EVAL LOW COMPLEX 20 MIN: CPT

## 2025-06-24 PROCEDURE — 80048 BASIC METABOLIC PNL TOTAL CA: CPT

## 2025-06-24 PROCEDURE — 97530 THERAPEUTIC ACTIVITIES: CPT

## 2025-06-24 PROCEDURE — 1200000000 HC SEMI PRIVATE

## 2025-06-24 PROCEDURE — 85025 COMPLETE CBC W/AUTO DIFF WBC: CPT

## 2025-06-24 PROCEDURE — 2580000003 HC RX 258: Performed by: SURGERY

## 2025-06-24 PROCEDURE — 97116 GAIT TRAINING THERAPY: CPT

## 2025-06-24 PROCEDURE — 84100 ASSAY OF PHOSPHORUS: CPT

## 2025-06-24 RX ORDER — CALCIUM CARBONATE 500 MG/1
500 TABLET, CHEWABLE ORAL 3 TIMES DAILY PRN
Status: DISCONTINUED | OUTPATIENT
Start: 2025-06-24 | End: 2025-06-27 | Stop reason: HOSPADM

## 2025-06-24 RX ADMIN — CALCIUM CARBONATE (ANTACID) CHEW TAB 500 MG 500 MG: 500 CHEW TAB at 21:27

## 2025-06-24 RX ADMIN — ENOXAPARIN SODIUM 40 MG: 100 INJECTION SUBCUTANEOUS at 17:01

## 2025-06-24 RX ADMIN — HYDROCHLOROTHIAZIDE 12.5 MG: 25 TABLET ORAL at 10:15

## 2025-06-24 RX ADMIN — POTASSIUM BICARBONATE 40 MEQ: 782 TABLET, EFFERVESCENT ORAL at 10:17

## 2025-06-24 RX ADMIN — OXYCODONE 5 MG: 5 TABLET ORAL at 07:54

## 2025-06-24 RX ADMIN — OXYCODONE 5 MG: 5 TABLET ORAL at 13:58

## 2025-06-24 RX ADMIN — ACETAMINOPHEN 650 MG: 325 TABLET ORAL at 21:26

## 2025-06-24 RX ADMIN — POTASSIUM CHLORIDE, DEXTROSE MONOHYDRATE AND SODIUM CHLORIDE: 150; 5; 900 INJECTION, SOLUTION INTRAVENOUS at 04:05

## 2025-06-24 RX ADMIN — ALVIMOPAN 12 MG: 12 CAPSULE ORAL at 10:15

## 2025-06-24 RX ADMIN — LOSARTAN POTASSIUM 100 MG: 50 TABLET, FILM COATED ORAL at 10:15

## 2025-06-24 RX ADMIN — ACETAMINOPHEN 650 MG: 325 TABLET ORAL at 03:52

## 2025-06-24 RX ADMIN — CARBIDOPA AND LEVODOPA 1 TABLET: 25; 100 TABLET ORAL at 10:15

## 2025-06-24 RX ADMIN — POTASSIUM PHOSPHATE, MONOBASIC AND POTASSIUM PHOSPHATE, DIBASIC 30 MMOL: 224; 236 INJECTION, SOLUTION, CONCENTRATE INTRAVENOUS at 10:20

## 2025-06-24 RX ADMIN — ACETAMINOPHEN 650 MG: 325 TABLET ORAL at 10:14

## 2025-06-24 RX ADMIN — ACETAMINOPHEN 650 MG: 325 TABLET ORAL at 17:01

## 2025-06-24 RX ADMIN — MIRTAZAPINE 7.5 MG: 15 TABLET, FILM COATED ORAL at 21:26

## 2025-06-24 RX ADMIN — CARBIDOPA AND LEVODOPA 1 TABLET: 25; 100 TABLET ORAL at 13:52

## 2025-06-24 RX ADMIN — FINASTERIDE 5 MG: 5 TABLET, FILM COATED ORAL at 10:15

## 2025-06-24 RX ADMIN — CARBIDOPA AND LEVODOPA 1 TABLET: 25; 100 TABLET ORAL at 17:01

## 2025-06-24 RX ADMIN — CARBIDOPA AND LEVODOPA 1 TABLET: 25; 100 TABLET ORAL at 21:27

## 2025-06-24 ASSESSMENT — PAIN DESCRIPTION - DESCRIPTORS
DESCRIPTORS: ACHING;SORE
DESCRIPTORS: ACHING;SORE
DESCRIPTORS: ACHING;TENDER;SHARP
DESCRIPTORS: ACHING
DESCRIPTORS: ACHING;DISCOMFORT

## 2025-06-24 ASSESSMENT — PAIN DESCRIPTION - LOCATION
LOCATION: ABDOMEN

## 2025-06-24 ASSESSMENT — PAIN SCALES - GENERAL
PAINLEVEL_OUTOF10: 5
PAINLEVEL_OUTOF10: 3
PAINLEVEL_OUTOF10: 3
PAINLEVEL_OUTOF10: 5

## 2025-06-24 ASSESSMENT — PAIN DESCRIPTION - PAIN TYPE
TYPE: SURGICAL PAIN
TYPE: SURGICAL PAIN

## 2025-06-24 ASSESSMENT — PAIN DESCRIPTION - ORIENTATION
ORIENTATION: MID
ORIENTATION: ANTERIOR
ORIENTATION: RIGHT;LEFT
ORIENTATION: MID
ORIENTATION: ANTERIOR

## 2025-06-24 NOTE — PROGRESS NOTES
Department of General Surgery - Adult  Surgical Service   Attending Progress Note      SUBJECTIVE: Patient complains of incisional pain; using analgesics as needed; tolerating ice chips, water.  No bowel movement or flatus.    OBJECTIVE      Physical    VITALS:  BP (!) 108/55   Pulse 76   Temp 98.2 °F (36.8 °C) (Oral)   Resp 15   Wt 85.3 kg (188 lb 0.8 oz)   SpO2 93%   BMI 24.14 kg/m²   INTAKE/OUTPUT:    Intake/Output Summary (Last 24 hours) at 6/24/2025 0837  Last data filed at 6/24/2025 0700  Gross per 24 hour   Intake 3586.15 ml   Output 1240 ml   Net 2346.15 ml     ABDOMEN: Nondistended, soft.  Dressings dry and intact.  Serosanguineous drain fluid.  Appropriate incisional pain with palpation.    Data  CBC with Differential:    Lab Results   Component Value Date/Time    WBC 10.1 06/24/2025 03:34 AM    RBC 3.31 06/24/2025 03:34 AM    HGB 10.9 06/24/2025 03:34 AM    HCT 31.2 06/24/2025 03:34 AM     06/24/2025 03:34 AM    MCV 94.3 06/24/2025 03:34 AM    MCH 32.9 06/24/2025 03:34 AM    MCHC 34.9 06/24/2025 03:34 AM    RDW 13.2 06/24/2025 03:34 AM    NRBC 0.0 06/24/2025 03:34 AM    NRBC 0.00 06/24/2025 03:34 AM    LYMPHOPCT 3.0 06/24/2025 03:34 AM    MONOPCT 7.0 06/24/2025 03:34 AM    EOSPCT 0.0 06/24/2025 03:34 AM    BASOPCT 0.1 06/24/2025 03:34 AM    MONOSABS 0.71 06/24/2025 03:34 AM    LYMPHSABS 0.30 06/24/2025 03:34 AM    EOSABS 0.00 06/24/2025 03:34 AM    BASOSABS 0.01 06/24/2025 03:34 AM    DIFFTYPE AUTOMATED 06/24/2025 03:34 AM     BMP:    Lab Results   Component Value Date/Time     06/24/2025 03:34 AM    K 3.3 06/24/2025 03:34 AM     06/24/2025 03:34 AM    CO2 28 06/24/2025 03:34 AM    BUN 6 06/24/2025 03:34 AM    CREATININE 1.02 06/24/2025 03:34 AM    CALCIUM 8.3 06/24/2025 03:34 AM    GFRAA >60 10/01/2019 03:47 PM    LABGLOM 77 06/24/2025 03:34 AM    GLUCOSE 154 06/24/2025 03:34 AM     Magnesium:    Lab Results   Component Value Date/Time    MG 1.8 06/24/2025 03:34 AM

## 2025-06-24 NOTE — PLAN OF CARE
Problem: Physical Therapy - Adult  Goal: By Discharge: Performs mobility at highest level of function for planned discharge setting.  See evaluation for individualized goals.  Description: FUNCTIONAL STATUS PRIOR TO ADMISSION: Patient was independent and active without use of DME. 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    HOME SUPPORT PRIOR TO ADMISSION: The patient lived with his wife.    Physical Therapy Goals  Initiated 6/24/2025  1.  Patient will move from supine to sit and sit to supine and roll side to side in bed with modified independence within 7 day(s).    2.  Patient will perform sit to stand with modified independence within 7 day(s).  3.  Patient will transfer from bed to chair and chair to bed with modified independence using the least restrictive device within 7 day(s).  4.  Patient will ambulate with supervision/set-up for 100 feet with the least restrictive device within 7 day(s).   5.  Patient will ascend/descend 2 stairs with handrail(s) with supervision/set-up within 7 day(s).    Outcome: Progressing   PHYSICAL THERAPY EVALUATION    Patient: Theron Sterling JrBharat (75 y.o. male)  Date: 6/24/2025  Primary Diagnosis: Hypokalemia [E87.6]  Hyponatremia [E87.1]  Sigmoid volvulus (HCC) [K56.2]  Procedure(s) (LRB):  LAPAROSCOPIC SIGMOID RESECTION WITH RIGID PROCTOSCOPY (N/A) 1 Day Post-Op   Precautions: Restrictions/Precautions  Restrictions/Precautions: Fall Risk            ASSESSMENT :   DEFICITS/IMPAIRMENTS:   The patient is limited by decreased functional mobility, independence in ADLs, high-level IADLs, ROM, strength, body mechanics, activity tolerance, cognition, coordination, balance, increased pain levels s/p admission for abdominal pain now POD # 1 from sigmoid resection. Prior to admission patient & family report that he was independent without DME and still working full time. Today he was received in bed, oriented to name but

## 2025-06-24 NOTE — CONSULTS
Nutrition Education    Educated on low fiber diet // ERAS  Learners: Patient and Family  Readiness: Eager  Method: Explanation, Handout, and Teachback  Response: Verbalizes Understanding and Demonstrated Understanding  Contact name and number provided.    74 yo male admitted for sigmoid volvulus, presented with abdominal pain and diarrhea. PMH of dementia, Parkinson's disease, HTN, GERD. S/p colonoscopy with rectal tube placement 6/19. Rectal tube pulled out by pt 6/21, not replaced. S/p laparoscopic sigmoid resection with rigid proctoscopy and splenic flexure mobilization 6/23.     Chart reviewed; RD provided a 5-day supply of Ensure Surgery/Immunonutrition (10 bottles) to the bedside per ERAS protocol and discussed the importance of adequate nutrition/supplement compliance in effort to optimize wound healing and recovery from surgery. Provided low fiber diet education as well. Pt and family agreeable, RD anticipates compliance.    Homa Jj RD  Contact via [employer]

## 2025-06-24 NOTE — OP NOTE
[address]  [address]                            OPERATIVE REPORT      PATIENT NAME: MELISSA PINK                : 1950  MED REC NO: [id number]                       ROOM: 506  ACCOUNT NO: [id number]                       ADMIT DATE: 2025  PROVIDER: hPil Gamboa MD    DATE OF SERVICE:  2025    PREOPERATIVE DIAGNOSES:  Sigmoid volvulus.    POSTOPERATIVE DIAGNOSES:  Sigmoid volvulus.    PROCEDURES PERFORMED:       1. Hand assist laparoscopic sigmoid resection with coloproctostomy (CPT 29153).       2. Splenic flexure mobilization (CPT 83152).    SURGEON:  Phil Gamboa MD    ASSISTANT:  Janine Dotson SA.    ANESTHESIA:  General endotracheal.    DRAINS:  19 mm Broderick drain.    SPONGE COUNT:  Correct.    NEEDLE COUNT:  Correct.    ESTIMATED BLOOD LOSS:  150 mL.    SPECIMENS REMOVED:       1. Sigmoid colon.     2. Anastomotic rings.    COMPLICATIONS:  None.    IMPLANTS:  Amnion and chorion anastomotic buttress.    INDICATIONS:  The patient is a 75-year-old white male with multiple medical problems to include dementia, Parkinson disease, hypertension, recently transferred to [facility] with signs of sigmoid volvulus.  He underwent endoscopic decompression, which was successful, with rectal tube insertion.  He and his family elected resection with primary anastomosis if possible.  Over the weekend, he underwent bowel prep, and he was taken to the operating room today for laparoscopic sigmoid resection.    INTRAOPERATIVE FINDINGS:       1. Distended, redundant sigmoid colon.      2. Resection with splenic flexure mobilization, stapled coloproctostomy.      3. No insufflation air leak or hemorrhage on rigid proctoscopy.    DESCRIPTION OF PROCEDURE:  The patient was identified as the correct patient in the preoperative holding area and informed consent was confirmed.  After answering the patient's remaining questions

## 2025-06-24 NOTE — CARE COORDINATION
Care Management Initial Assessment       RUR: 11%  Readmission? No  1st IM letter given? No n/a  1st  letter given: No n/a    Transition of Care: TBD; likely home with f/u with specialist/pcp    Transport Plan: likely in car    RUR: 11%    Main contact is pts spouse- Cassidy Sterling- [phone]    Discharge pending:  -pending medical progress and care recommendations    1315: this Cm met with  pleasant pt and his spouse Cassidy and daughter Geri at bedside; pt is alert but has some confusion; per pts spouse- pt lives at stated address (multi level home) in [place]; pt is normally independent in his adls; pts spouse is his main caregiver with support from his two daughters; pt does not have or use a walker; pts spouse confirms commercial insurance plan and pcp; preferred pharmacy is the [facility] in [place]       06/24/25 1308   Service Assessment   Patient Orientation Other (see comment)  (is alert but has intermittant confusion)   Cognition Short Term Memory Deficit   History Provided By Spouse;Child/Family   Primary Caregiver Spouse   Support Systems Spouse/Significant Other;Children   Patient's Healthcare Decision Maker is: Legal Next of Kin   PCP Verified by CM Yes   Last Visit to PCP Within last 3 months   Prior Functional Level Assistance with the following:;Mobility   Current Functional Level Assistance with the following:;Mobility   Can patient return to prior living arrangement Yes   Ability to make needs known: Fair   Family able to assist with home care needs: Yes   Financial Resources Other (Comment)  (commercial insurance)   Social/Functional History   Lives With Spouse   Type of Home House   Home Layout Two level   Home Access Stairs to enter with rails   Entrance Stairs - Number of Steps 3   Receives Help From Family   Prior Level of Assist for ADLs Independent   Ambulation Assistance Independent   Prior Level of Assist for Transfers Independent   Occupation Retired   Discharge Planning   Type of

## 2025-06-24 NOTE — PROGRESS NOTES
Hospitalist Progress Note  Lucretia Taylor PA-C  Answering service: [phone] OR [phone] from in house phone        Date of Service:  2025  NAME:  Theron Sterling Jr.  :  1950  MRN:  [id number]      Admission Summary:     Theron Sterling Jr. is a 75 y.o. male with past medical history significant for dementia, Parkinson disease, hypertension initially presented at [facility] emergency room at [facility] with abdominal pain.  Patient symptoms started a couple of days ago and got worse overnight.  The pain is diffuse in location, pressure-like, no radiation, no known aggravating or relieving factors and patient is unable to state the severity of the pain.  Patient is also not able to provide good history because of his underlying dementia.  He also reported diarrhea without nausea and vomiting.  He has no record of a prior admission to this hospital.  CT scan of the abdomen and pelvis done in the emergency room showed sigmoid volvulus.  Patient was transferred to the emergency room at [facility] for urgent GI and general surgery consult.  He was subsequently referred to the hospitalist service for admission.          Interval history / Subjective:     POD1 Colon resection   Seen and examined laying in bed comfortably.  No complaints at this time, denies abdominal pain, nausea/vomiting, chest pain, shortness of breath.  Had a buttered toast for breakfast which he reports went well. Per Family, pt had 1 loose BM overnight.   Daughter and wife at bedside.     Assessment & Plan:     Sigmoid Volvulus   - reduced endoscopically with Chiqui on    - s/p colon resection with Dr Gamboa   - rectal tube was pulled on accident over the weekend, leave out     Electrolyte Disturbances   Hypomagnesemia (resolved)  Hyponatremia  Hyponatremia   - likely related to volume depletion   - Repletion

## 2025-06-25 LAB
ANION GAP SERPL CALC-SCNC: 7 MMOL/L (ref 2–12)
BASOPHILS # BLD: 0 K/UL (ref 0–0.1)
BASOPHILS NFR BLD: 0 % (ref 0–1)
BUN SERPL-MCNC: 8 MG/DL (ref 6–20)
BUN/CREAT SERPL: 7 (ref 12–20)
CALCIUM SERPL-MCNC: 8.4 MG/DL (ref 8.5–10.1)
CHLORIDE SERPL-SCNC: 102 MMOL/L (ref 97–108)
CO2 SERPL-SCNC: 29 MMOL/L (ref 21–32)
CREAT SERPL-MCNC: 1.08 MG/DL (ref 0.7–1.3)
DIFFERENTIAL METHOD BLD: ABNORMAL
EOSINOPHIL # BLD: 0.01 K/UL (ref 0–0.4)
EOSINOPHIL NFR BLD: 0.1 % (ref 0–7)
ERYTHROCYTE [DISTWIDTH] IN BLOOD BY AUTOMATED COUNT: 13.6 % (ref 11.5–14.5)
GLUCOSE SERPL-MCNC: 140 MG/DL (ref 65–100)
HCT VFR BLD AUTO: 31.9 % (ref 36.6–50.3)
HGB BLD-MCNC: 11.1 G/DL (ref 12.1–17)
IMM GRANULOCYTES # BLD AUTO: 0.02 K/UL (ref 0–0.04)
IMM GRANULOCYTES NFR BLD AUTO: 0.2 % (ref 0–0.5)
LYMPHOCYTES # BLD: 0.33 K/UL (ref 0.8–3.5)
LYMPHOCYTES NFR BLD: 3.1 % (ref 12–49)
MCH RBC QN AUTO: 32.7 PG (ref 26–34)
MCHC RBC AUTO-ENTMCNC: 34.8 G/DL (ref 30–36.5)
MCV RBC AUTO: 94.1 FL (ref 80–99)
MONOCYTES # BLD: 0.59 K/UL (ref 0–1)
MONOCYTES NFR BLD: 5.5 % (ref 5–13)
NEUTS SEG # BLD: 9.84 K/UL (ref 1.8–8)
NEUTS SEG NFR BLD: 91.1 % (ref 32–75)
NRBC # BLD: 0 K/UL (ref 0–0.01)
NRBC BLD-RTO: 0 PER 100 WBC
PLATELET # BLD AUTO: 232 K/UL (ref 150–400)
PMV BLD AUTO: 10.3 FL (ref 8.9–12.9)
POTASSIUM SERPL-SCNC: 4.1 MMOL/L (ref 3.5–5.1)
RBC # BLD AUTO: 3.39 M/UL (ref 4.1–5.7)
RBC MORPH BLD: ABNORMAL
SODIUM SERPL-SCNC: 138 MMOL/L (ref 136–145)
WBC # BLD AUTO: 10.8 K/UL (ref 4.1–11.1)

## 2025-06-25 PROCEDURE — 97530 THERAPEUTIC ACTIVITIES: CPT

## 2025-06-25 PROCEDURE — 97535 SELF CARE MNGMENT TRAINING: CPT

## 2025-06-25 PROCEDURE — 2500000003 HC RX 250 WO HCPCS: Performed by: SURGERY

## 2025-06-25 PROCEDURE — 97165 OT EVAL LOW COMPLEX 30 MIN: CPT

## 2025-06-25 PROCEDURE — 85025 COMPLETE CBC W/AUTO DIFF WBC: CPT

## 2025-06-25 PROCEDURE — 6360000002 HC RX W HCPCS: Performed by: SURGERY

## 2025-06-25 PROCEDURE — 97116 GAIT TRAINING THERAPY: CPT

## 2025-06-25 PROCEDURE — 80048 BASIC METABOLIC PNL TOTAL CA: CPT

## 2025-06-25 PROCEDURE — 6370000000 HC RX 637 (ALT 250 FOR IP): Performed by: SURGERY

## 2025-06-25 PROCEDURE — 1200000000 HC SEMI PRIVATE

## 2025-06-25 RX ORDER — TRAMADOL HYDROCHLORIDE 50 MG/1
50 TABLET ORAL EVERY 6 HOURS PRN
Status: DISCONTINUED | OUTPATIENT
Start: 2025-06-25 | End: 2025-06-27 | Stop reason: HOSPADM

## 2025-06-25 RX ADMIN — ONDANSETRON 4 MG: 4 TABLET, ORALLY DISINTEGRATING ORAL at 08:39

## 2025-06-25 RX ADMIN — ALVIMOPAN 12 MG: 12 CAPSULE ORAL at 20:44

## 2025-06-25 RX ADMIN — MIRTAZAPINE 7.5 MG: 15 TABLET, FILM COATED ORAL at 20:43

## 2025-06-25 RX ADMIN — CARBIDOPA AND LEVODOPA 1 TABLET: 25; 100 TABLET ORAL at 20:43

## 2025-06-25 RX ADMIN — HYDROCHLOROTHIAZIDE 12.5 MG: 25 TABLET ORAL at 10:19

## 2025-06-25 RX ADMIN — FINASTERIDE 5 MG: 5 TABLET, FILM COATED ORAL at 10:19

## 2025-06-25 RX ADMIN — SODIUM CHLORIDE, PRESERVATIVE FREE 10 ML: 5 INJECTION INTRAVENOUS at 10:23

## 2025-06-25 RX ADMIN — POTASSIUM CHLORIDE, DEXTROSE MONOHYDRATE AND SODIUM CHLORIDE: 150; 5; 900 INJECTION, SOLUTION INTRAVENOUS at 15:20

## 2025-06-25 RX ADMIN — CARBIDOPA AND LEVODOPA 1 TABLET: 25; 100 TABLET ORAL at 10:21

## 2025-06-25 RX ADMIN — ENOXAPARIN SODIUM 40 MG: 100 INJECTION SUBCUTANEOUS at 17:32

## 2025-06-25 RX ADMIN — ACETAMINOPHEN 650 MG: 325 TABLET ORAL at 16:35

## 2025-06-25 RX ADMIN — CARBIDOPA AND LEVODOPA 1 TABLET: 25; 100 TABLET ORAL at 13:48

## 2025-06-25 RX ADMIN — ALVIMOPAN 12 MG: 12 CAPSULE ORAL at 10:20

## 2025-06-25 RX ADMIN — LOSARTAN POTASSIUM 100 MG: 50 TABLET, FILM COATED ORAL at 10:19

## 2025-06-25 RX ADMIN — ACETAMINOPHEN 650 MG: 325 TABLET ORAL at 20:42

## 2025-06-25 RX ADMIN — CARBIDOPA AND LEVODOPA 1 TABLET: 25; 100 TABLET ORAL at 16:36

## 2025-06-25 RX ADMIN — POTASSIUM CHLORIDE, DEXTROSE MONOHYDRATE AND SODIUM CHLORIDE: 150; 5; 900 INJECTION, SOLUTION INTRAVENOUS at 04:39

## 2025-06-25 RX ADMIN — ACETAMINOPHEN 650 MG: 325 TABLET ORAL at 10:21

## 2025-06-25 ASSESSMENT — PAIN DESCRIPTION - ORIENTATION: ORIENTATION: RIGHT;LEFT

## 2025-06-25 ASSESSMENT — PAIN SCALES - GENERAL
PAINLEVEL_OUTOF10: 6
PAINLEVEL_OUTOF10: 3

## 2025-06-25 ASSESSMENT — PAIN DESCRIPTION - LOCATION: LOCATION: ABDOMEN

## 2025-06-25 ASSESSMENT — PAIN DESCRIPTION - DESCRIPTORS: DESCRIPTORS: ACHING;DISCOMFORT

## 2025-06-25 NOTE — CARE COORDINATION
Transition of Care:  likely home with f/u with specialist/pcp; patient likely needs home health SN/PT/OT (no orders yet) ; PENDING: NEW walker from [employer]- if accepted- the walker will be delivered to hospital bedside on 6/25     Transport Plan: likely in car     RUR: 11%     Main contact is pts spouse- Cassidy Sterling- [phone]     Discharge pending:    - status post laparoscopic sigmoidectomy, post op day #  2.  Awaiting return of bowel function.     -pending medical progress and care recommendations    1545: this CM noted the order for a new walker; this CM placed order via [employer]; if accepted; it will be delivered to hospital bedside on 6/25      Prior Level of Functioning: lives at home with his spouse; needs help with adls; has hx of Parkinsons  Disposition: likely home with HH  DESTINEE: 24-48 hours  If SNF or IPR: Date FOC offered: n/a  Date FOC received: n/a  Accepting facility: n/a  Date authorization started with reference number: n/a  Date authorization received and expires: n/a  Follow up appointments: specialists/pcp  DME needed: ordered walker via [employer] on 6/25  Transportation at discharge: in car with family  IM/IMM Medicare/ letter given: n/a  Is patient a  and connected with VA? N/a   If yes, was  transfer form completed and VA notified? N/a  Caregiver Contact: pts spouse  Discharge Caregiver contacted prior to discharge? Not yet  Care Conference needed? no  Barriers to discharge:  medical progress    CM following   Babita Mae RN     NOTE: per previous CM notes:  pleasant pt and his spouse Cassidy and daughter Geri at bedside; pt is alert but has some confusion; per pts spouse- pt lives at stated address (multi level home) in [place]; pt is normally independent in his adls; pts spouse is his main caregiver with support from his two daughters; pt does not have or use a walker; pts spouse confirms commercial insurance plan and pcp; preferred pharmacy is the [facility] in [place]

## 2025-06-25 NOTE — PLAN OF CARE
Problem: Occupational Therapy - Adult  Goal: By Discharge: Performs self-care activities at highest level of function for planned discharge setting.  See evaluation for individualized goals.  Description: FUNCTIONAL STATUS PRIOR TO ADMISSION:  Pt was independent with ADLs and IADLs prior to admission. Pt works full-time.    HOME SUPPORT: Patient lived with spouse but didn't require assistance.    Occupational Therapy Goals:  Initiated 6/25/2025  1.  Patient will perform grooming standing at sink with Stand by Assist within 7 day(s).  2.  Patient will perform lower body dressing with Minimal Assist within 7 day(s).  3.  Patient will perform bathing with Supervision within 7 day(s).  4.  Patient will perform toilet transfers with Supervision  within 7 day(s).  5.  Patient will perform all aspects of toileting with Minimal Assist within 7 day(s).  6.  Patient will participate in upper extremity therapeutic exercise/activities with Set-up for 10 minutes within 7 day(s).    7.  Patient will utilize energy conservation techniques during functional activities with verbal cues within 7 day(s).   Outcome: Progressing     OCCUPATIONAL THERAPY EVALUATION    Patient: Theron Sterling Jr. (75 y.o. male)  Date: 6/25/2025  Primary Diagnosis: Hypokalemia [E87.6]  Hyponatremia [E87.1]  Sigmoid volvulus (HCC) [K56.2]  Procedure(s) (LRB):  LAPAROSCOPIC SIGMOID RESECTION WITH RIGID PROCTOSCOPY (N/A) 2 Days Post-Op     Precautions: Fall Risk                  ASSESSMENT :  The patient is limited by decreased functional mobility, independence in ADLs, high-level IADLs, ROM, strength, body mechanics, activity tolerance, endurance, safety awareness, cognition, command following, attention/concentration, coordination, balance, posture, fine-motor control.    Based on the impairments listed above pt would benefit from acute OT services s/p admission for abdominal pain and now POD#2 from sigmoid resection. Prior to admission pt was independent  Education  Education Given To: Patient  Education Provided: Plan of Care;Role of Therapy;Family Education;Precautions;ADL Adaptive Strategies;Equipment;Transfer Training;Fall Prevention Strategies;Mobility Training;Energy Conservation  Education Method: Demonstration;Verbal  Barriers to Learning: None  Education Outcome: Verbalized understanding;Demonstrated understanding;Continued education needed    Thank you for this referral.  Adriane Antonio OT  Minutes: 32    Occupational Therapy Evaluation Charge Determination   History Examination Decision-Making   LOW Complexity : Brief history review  LOW Complexity: 1-3 Performance deficits relating to physical, cognitive, or psychosocial skills that result in activity limitations and/or participation restrictions LOW Complexity: No comorbidities that affect functional and  no verbal  or physical assist needed to complete eval tasks   Based on the above components, the patient evaluation is determined to be of the following complexity level: Low

## 2025-06-25 NOTE — PLAN OF CARE
Problem: Physical Therapy - Adult  Goal: By Discharge: Performs mobility at highest level of function for planned discharge setting.  See evaluation for individualized goals.  Description: FUNCTIONAL STATUS PRIOR TO ADMISSION: Patient was independent and active without use of DME. 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    HOME SUPPORT PRIOR TO ADMISSION: The patient lived with his wife.    Physical Therapy Goals  Initiated 6/24/2025  1.  Patient will move from supine to sit and sit to supine and roll side to side in bed with modified independence within 7 day(s).    2.  Patient will perform sit to stand with modified independence within 7 day(s).  3.  Patient will transfer from bed to chair and chair to bed with modified independence using the least restrictive device within 7 day(s).  4.  Patient will ambulate with supervision/set-up for 100 feet with the least restrictive device within 7 day(s).   5.  Patient will ascend/descend 2 stairs with handrail(s) with supervision/set-up within 7 day(s).    Outcome: Progressing   PHYSICAL THERAPY TREATMENT    Patient: Theron Sterling JrBharat (75 y.o. male)  Date: 6/25/2025  Diagnosis: Hypokalemia [E87.6]  Hyponatremia [E87.1]  Sigmoid volvulus (HCC) [K56.2] Sigmoid volvulus (HCC)  Procedure(s) (LRB):  LAPAROSCOPIC SIGMOID RESECTION WITH RIGID PROCTOSCOPY (N/A) 2 Days Post-Op  Precautions: Restrictions/Precautions  Restrictions/Precautions: Fall Risk            ASSESSMENT:  Patient continues to benefit from skilled PT services and is progressing towards goals. Patient continues to demonstrate decreased activity tolerance and balance deficits. Pt required assistance to perform log roll technique with increased cues and fair carryover. Pt demonstrated improvements in transfers and progression with ambulation. Provided cues to increase step length with partial carryover and requiring continued verbal cues. Trialed

## 2025-06-25 NOTE — PROGRESS NOTES
Physical Therapy 6/25/2025    Chart reviewed up to date. Attempted PT, pt sleeping soundly and daughters present. Will follow back this afternoon.    Thank you.  Glenny Zaragoza, PT, DPT

## 2025-06-25 NOTE — PLAN OF CARE
Problem: Pain  Goal: Verbalizes/displays adequate comfort level or baseline comfort level  6/24/2025 2343 by Ramón Alexis, RN  Outcome: Progressing     Problem: Safety - Adult  Goal: Free from fall injury  6/24/2025 2343 by Ramón Alexis, RN  Outcome: Progressing     Problem: Skin/Tissue Integrity  Goal: Skin integrity remains intact  Description: 1.  Monitor for areas of redness and/or skin breakdown  2.  Assess vascular access sites hourly  3.  Every 4-6 hours minimum:  Change oxygen saturation probe site  4.  Every 4-6 hours:  If on nasal continuous positive airway pressure, respiratory therapy assess nares and determine need for appliance change or resting period  6/24/2025 2343 by Ramón Alexis, RN  Outcome: Progressing

## 2025-06-25 NOTE — PROGRESS NOTES
Department of General Surgery - Adult  Surgical Service   Attending Progress Note      SUBJECTIVE: Patient complains of decreased incisional pain; using non-opioid analgesics as needed; tolerating applesauce, sips of water; single volume emesis overnight; no bowel movement or flatus.    OBJECTIVE      Physical    VITALS:  BP (!) 157/93   Pulse (!) 104   Temp 98.8 °F (37.1 °C) (Oral)   Resp 15   Wt 85.3 kg (188 lb 0.8 oz)   SpO2 91%   BMI 24.14 kg/m²   INTAKE/OUTPUT:    Intake/Output Summary (Last 24 hours) at 6/25/2025 1129  Last data filed at 6/25/2025 0829  Gross per 24 hour   Intake 480 ml   Output 1190 ml   Net -710 ml     ABDOMEN: Nondistended, soft.  Dressings dry and intact.  Serosanguineous drain fluid.  Appropriate incisional pain with palpation.    Data  CBC with Differential:    Lab Results   Component Value Date/Time    WBC 10.8 06/25/2025 02:18 AM    RBC 3.39 06/25/2025 02:18 AM    HGB 11.1 06/25/2025 02:18 AM    HCT 31.9 06/25/2025 02:18 AM     06/25/2025 02:18 AM    MCV 94.1 06/25/2025 02:18 AM    MCH 32.7 06/25/2025 02:18 AM    MCHC 34.8 06/25/2025 02:18 AM    RDW 13.6 06/25/2025 02:18 AM    NRBC 0.0 06/25/2025 02:18 AM    NRBC 0.00 06/25/2025 02:18 AM    LYMPHOPCT 3.1 06/25/2025 02:18 AM    MONOPCT 5.5 06/25/2025 02:18 AM    EOSPCT 0.1 06/25/2025 02:18 AM    BASOPCT 0.0 06/25/2025 02:18 AM    MONOSABS 0.59 06/25/2025 02:18 AM    LYMPHSABS 0.33 06/25/2025 02:18 AM    EOSABS 0.01 06/25/2025 02:18 AM    BASOSABS 0.00 06/25/2025 02:18 AM    DIFFTYPE SMEAR SCANNED 06/25/2025 02:18 AM     BMP:    Lab Results   Component Value Date/Time     06/25/2025 02:18 AM    K 4.1 06/25/2025 02:18 AM     06/25/2025 02:18 AM    CO2 29 06/25/2025 02:18 AM    BUN 8 06/25/2025 02:18 AM    CREATININE 1.08 06/25/2025 02:18 AM    CALCIUM 8.4 06/25/2025 02:18 AM    GFRAA >60 10/01/2019 03:47 PM    LABGLOM 72 06/25/2025 02:18 AM    GLUCOSE 140 06/25/2025 02:18 AM     Magnesium:    Lab Results

## 2025-06-25 NOTE — PROGRESS NOTES
Hospitalist Progress Note  Lucretia Taylor PA-C  Answering service: [phone] OR [phone] from in house phone        Date of Service:  2025  NAME:  Theron Sterling Jr.  :  1950  MRN:  [id number]      Admission Summary:     Theron Sterling Jr. is a 75 y.o. male with past medical history significant for dementia, Parkinson disease, hypertension initially presented at [facility] emergency room at [facility] with abdominal pain.  Patient symptoms started a couple of days ago and got worse overnight.  The pain is diffuse in location, pressure-like, no radiation, no known aggravating or relieving factors and patient is unable to state the severity of the pain.  Patient is also not able to provide good history because of his underlying dementia.  He also reported diarrhea without nausea and vomiting.  He has no record of a prior admission to this hospital.  CT scan of the abdomen and pelvis done in the emergency room showed sigmoid volvulus.  Patient was transferred to the emergency room at [facility] for urgent GI and general surgery consult.  He was subsequently referred to the hospitalist service for admission.          Interval history / Subjective:     POD2 Colon resection   Seen and examined laying in bed comfortably.  No complaints at this time, denies abdominal pain, nausea/vomiting, chest pain, shortness of breath.  Had a few small bites of toast for breakfast. Patient reports not liking how the oxycodone made him feel yesterday.   Per Gen Surg, maintain Aparicio until tomorrow given difficult insertion   Daughters and wife at bedside.     Assessment & Plan:     Sigmoid Volvulus   - reduced endoscopically with Chiqui on    - s/p colon resection with Dr Gamboa   - rectal tube was pulled on accident over the weekend, leave out   - ADAT    Electrolyte Disturbances   Hypomagnesemia

## 2025-06-26 LAB
ANION GAP SERPL CALC-SCNC: 3 MMOL/L (ref 2–12)
BASOPHILS # BLD: 0.03 K/UL (ref 0–0.1)
BASOPHILS NFR BLD: 0.3 % (ref 0–1)
BUN SERPL-MCNC: 10 MG/DL (ref 6–20)
BUN/CREAT SERPL: 10 (ref 12–20)
CALCIUM SERPL-MCNC: 8.3 MG/DL (ref 8.5–10.1)
CHLORIDE SERPL-SCNC: 108 MMOL/L (ref 97–108)
CO2 SERPL-SCNC: 29 MMOL/L (ref 21–32)
CREAT SERPL-MCNC: 0.99 MG/DL (ref 0.7–1.3)
DIFFERENTIAL METHOD BLD: ABNORMAL
EOSINOPHIL # BLD: 0.32 K/UL (ref 0–0.4)
EOSINOPHIL NFR BLD: 3.7 % (ref 0–7)
ERYTHROCYTE [DISTWIDTH] IN BLOOD BY AUTOMATED COUNT: 13.5 % (ref 11.5–14.5)
GLUCOSE SERPL-MCNC: 103 MG/DL (ref 65–100)
HCT VFR BLD AUTO: 30.5 % (ref 36.6–50.3)
HGB BLD-MCNC: 10 G/DL (ref 12.1–17)
IMM GRANULOCYTES # BLD AUTO: 0.02 K/UL (ref 0–0.04)
IMM GRANULOCYTES NFR BLD AUTO: 0.2 % (ref 0–0.5)
LYMPHOCYTES # BLD: 0.79 K/UL (ref 0.8–3.5)
LYMPHOCYTES NFR BLD: 9.1 % (ref 12–49)
MCH RBC QN AUTO: 32.4 PG (ref 26–34)
MCHC RBC AUTO-ENTMCNC: 32.8 G/DL (ref 30–36.5)
MCV RBC AUTO: 98.7 FL (ref 80–99)
MONOCYTES # BLD: 0.55 K/UL (ref 0–1)
MONOCYTES NFR BLD: 6.3 % (ref 5–13)
NEUTS SEG # BLD: 6.99 K/UL (ref 1.8–8)
NEUTS SEG NFR BLD: 80.4 % (ref 32–75)
NRBC # BLD: 0 K/UL (ref 0–0.01)
NRBC BLD-RTO: 0 PER 100 WBC
PLATELET # BLD AUTO: 216 K/UL (ref 150–400)
PMV BLD AUTO: 10.8 FL (ref 8.9–12.9)
POTASSIUM SERPL-SCNC: 3.9 MMOL/L (ref 3.5–5.1)
RBC # BLD AUTO: 3.09 M/UL (ref 4.1–5.7)
RBC MORPH BLD: ABNORMAL
SODIUM SERPL-SCNC: 140 MMOL/L (ref 136–145)
WBC # BLD AUTO: 8.7 K/UL (ref 4.1–11.1)

## 2025-06-26 PROCEDURE — 51701 INSERT BLADDER CATHETER: CPT

## 2025-06-26 PROCEDURE — 97530 THERAPEUTIC ACTIVITIES: CPT

## 2025-06-26 PROCEDURE — 85025 COMPLETE CBC W/AUTO DIFF WBC: CPT

## 2025-06-26 PROCEDURE — 2500000003 HC RX 250 WO HCPCS: Performed by: SURGERY

## 2025-06-26 PROCEDURE — 6370000000 HC RX 637 (ALT 250 FOR IP): Performed by: SURGERY

## 2025-06-26 PROCEDURE — 6360000002 HC RX W HCPCS: Performed by: SURGERY

## 2025-06-26 PROCEDURE — 80048 BASIC METABOLIC PNL TOTAL CA: CPT

## 2025-06-26 PROCEDURE — 1200000000 HC SEMI PRIVATE

## 2025-06-26 PROCEDURE — 51798 US URINE CAPACITY MEASURE: CPT

## 2025-06-26 PROCEDURE — 97535 SELF CARE MNGMENT TRAINING: CPT

## 2025-06-26 PROCEDURE — 97116 GAIT TRAINING THERAPY: CPT

## 2025-06-26 RX ADMIN — CARBIDOPA AND LEVODOPA 1 TABLET: 25; 100 TABLET ORAL at 13:01

## 2025-06-26 RX ADMIN — ACETAMINOPHEN 650 MG: 325 TABLET ORAL at 17:04

## 2025-06-26 RX ADMIN — CARBIDOPA AND LEVODOPA 1 TABLET: 25; 100 TABLET ORAL at 09:03

## 2025-06-26 RX ADMIN — MIRTAZAPINE 7.5 MG: 15 TABLET, FILM COATED ORAL at 20:41

## 2025-06-26 RX ADMIN — HYDROCHLOROTHIAZIDE 12.5 MG: 25 TABLET ORAL at 09:03

## 2025-06-26 RX ADMIN — ALVIMOPAN 12 MG: 12 CAPSULE ORAL at 20:40

## 2025-06-26 RX ADMIN — SODIUM CHLORIDE, PRESERVATIVE FREE 10 ML: 5 INJECTION INTRAVENOUS at 20:45

## 2025-06-26 RX ADMIN — ALVIMOPAN 12 MG: 12 CAPSULE ORAL at 09:02

## 2025-06-26 RX ADMIN — ACETAMINOPHEN 650 MG: 325 TABLET ORAL at 04:03

## 2025-06-26 RX ADMIN — ACETAMINOPHEN 650 MG: 325 TABLET ORAL at 23:35

## 2025-06-26 RX ADMIN — ACETAMINOPHEN 650 MG: 325 TABLET ORAL at 09:03

## 2025-06-26 RX ADMIN — FINASTERIDE 5 MG: 5 TABLET, FILM COATED ORAL at 09:03

## 2025-06-26 RX ADMIN — ENOXAPARIN SODIUM 40 MG: 100 INJECTION SUBCUTANEOUS at 17:04

## 2025-06-26 RX ADMIN — LOSARTAN POTASSIUM 100 MG: 50 TABLET, FILM COATED ORAL at 09:02

## 2025-06-26 RX ADMIN — CARBIDOPA AND LEVODOPA 1 TABLET: 25; 100 TABLET ORAL at 20:41

## 2025-06-26 RX ADMIN — POTASSIUM CHLORIDE, DEXTROSE MONOHYDRATE AND SODIUM CHLORIDE: 150; 5; 900 INJECTION, SOLUTION INTRAVENOUS at 04:03

## 2025-06-26 RX ADMIN — CARBIDOPA AND LEVODOPA 1 TABLET: 25; 100 TABLET ORAL at 17:04

## 2025-06-26 RX ADMIN — POTASSIUM CHLORIDE, DEXTROSE MONOHYDRATE AND SODIUM CHLORIDE: 150; 5; 900 INJECTION, SOLUTION INTRAVENOUS at 17:05

## 2025-06-26 ASSESSMENT — PAIN DESCRIPTION - LOCATION: LOCATION: ABDOMEN

## 2025-06-26 ASSESSMENT — PAIN SCALES - GENERAL: PAINLEVEL_OUTOF10: 2

## 2025-06-26 ASSESSMENT — PAIN DESCRIPTION - ORIENTATION: ORIENTATION: ANTERIOR

## 2025-06-26 ASSESSMENT — PAIN DESCRIPTION - DESCRIPTORS: DESCRIPTORS: ACHING

## 2025-06-26 NOTE — CARE COORDINATION
Transition of Care:  home with home health SN/PT/OT and f/u with specialist/pcp;  PENDING (again) [facility], [facility], [facility], [facility], [facility]    the first home health referral sent out today there were  NO home health companies in network with pts insurance plan  ([employer]); this CM called the pts insurance plan to find out what HH agencies are in network- the pts insurance is managed by [employer] with  [employer] [facility] network; second set of referrals  sent to [facility] by [facility], [facility], [facility]     Also- PENDING:  [facility] (only PT/OT)- faxed referral on 6/26- if accepted they would call CM at [phone]      Initially DENIED: out of network when the companies saw [employer] (and not know that [employer] is also [employer] O network;   [facility]  [facility]  [facility]  [facility]  [facility]  [facility]- called them- OON and out of service area  [facility]- not in service   [facility]    NOTE: NEW walker from [facility]-   delivered to hospital bedside on 6/26     Transport Plan: likely in car     RUR: 10%     Main contact is pts spouse- Cassidy Asher- [phone]     Discharge pending:  -pending void trial  -pending home health set up  - status post laparoscopic sigmoidectomy, post op day #  3.  Awaiting return of bowel function.  -pending medical progress and care recommendations     1200: this Cm met with pt and his spouse at bedside to discuss home health; they are in agreement with HH; pt and spouse do not have any real preference for HH; multiple referrals (see list above) via careport     1500: there are no accepting agencies yet for home health SN/PT/OT; this Cm got verbal order from attending to send to [facility] for PT/OT; faxed referral to [phone]  (main number is [phone])      1552: this CM

## 2025-06-26 NOTE — PLAN OF CARE
Problem: Pain  Goal: Verbalizes/displays adequate comfort level or baseline comfort level  Outcome: Progressing     Problem: Safety - Adult  Goal: Free from fall injury  6/26/2025 0003 by Ramón Alexis RN  Outcome: Progressing     Problem: Skin/Tissue Integrity  Goal: Skin integrity remains intact  Description: 1.  Monitor for areas of redness and/or skin breakdown  2.  Assess vascular access sites hourly  3.  Every 4-6 hours minimum:  Change oxygen saturation probe site  4.  Every 4-6 hours:  If on nasal continuous positive airway pressure, respiratory therapy assess nares and determine need for appliance change or resting period  Outcome: Progressing

## 2025-06-26 NOTE — PLAN OF CARE
Problem: Occupational Therapy - Adult  Goal: By Discharge: Performs self-care activities at highest level of function for planned discharge setting.  See evaluation for individualized goals.  Description: FUNCTIONAL STATUS PRIOR TO ADMISSION:  Pt was independent with ADLs and IADLs prior to admission. Pt works full-time.    HOME SUPPORT: Patient lived with spouse but didn't require assistance.    Occupational Therapy Goals:  Initiated 6/25/2025  1.  Patient will perform grooming standing at sink with Stand by Assist within 7 day(s).  2.  Patient will perform lower body dressing with Minimal Assist within 7 day(s).  3.  Patient will perform bathing with Supervision within 7 day(s).  4.  Patient will perform toilet transfers with Supervision  within 7 day(s).  5.  Patient will perform all aspects of toileting with Minimal Assist within 7 day(s).  6.  Patient will participate in upper extremity therapeutic exercise/activities with Set-up for 10 minutes within 7 day(s).    7.  Patient will utilize energy conservation techniques during functional activities with verbal cues within 7 day(s).   Outcome: Progressing   OCCUPATIONAL THERAPY TREATMENT  Patient: Theron Sterling JrBharat (75 y.o. male)  Date: 6/26/2025  Primary Diagnosis: Hypokalemia [E87.6]  Hyponatremia [E87.1]  Sigmoid volvulus (HCC) [K56.2]  Procedure(s) (LRB):  LAPAROSCOPIC SIGMOID RESECTION WITH RIGID PROCTOSCOPY (N/A) 3 Days Post-Op   Precautions: Fall Risk   Chart, occupational therapy assessment, plan of care, and goals were reviewed.    ASSESSMENT  Patient continues to benefit from skilled OT services and is progressing towards goals. Patient received OOB in recliner chair upon OT arrival, agreeable to working with therapy. Daughter and wife were present at bedside. Patient transferred to standing with RW and ambulatory to bathroom, completed face washing at sink in standing with good tolerance of activity. Patient then ambulatory in hallway to end of      Transfers:   Transfer Training  Transfer Training: Yes  Interventions: Safety awareness training;Tactile cues;Verbal cues  Sit to Stand: Stand by assistance  Stand to Sit: Stand by assistance      Balance:     Balance  Sitting: Intact  Standing: Impaired;With support  Standing - Static: Good  Standing - Dynamic: Fair;Constant support      ADL Intervention:        Grooming: .  - Face Washing : Stand-by Assistance and Standing at Sink                                  Pain Rating:  No complaints     Pain Intervention(s):   pain is at a level acceptable to the patient    Activity Tolerance:   Good, requires rest breaks, and SpO2 stable on room air  Please refer to the flowsheet for vital signs taken during this treatment.    After treatment:   Patient left in no apparent distress sitting up in chair, Call bell within reach, Bed/ chair alarm activated, and Caregiver / family present    COMMUNICATION/EDUCATION:   The patient's plan of care was discussed with: registered nurse    Patient Education  Education Given To: Patient;Family  Education Provided: Plan of Care;Role of Therapy;Family Education;Precautions;ADL Adaptive Strategies;Equipment;Transfer Training;Fall Prevention Strategies;Mobility Training;Energy Conservation  Education Method: Verbal;Demonstration  Barriers to Learning: None  Education Outcome: Verbalized understanding;Demonstrated understanding;Continued education needed    Thank you for this referral.  Marge Hawley OTR/L  Minutes: 23

## 2025-06-26 NOTE — PROGRESS NOTES
[facility] Adult  Hospitalist Group                                                                                          Hospitalist Progress Note  Cary Olsen PA-C  Answering service: [phone] OR [phone] from in house phone        Date of Service:  2025  NAME:  Theron Sterling Jr.  :  1950  MRN:  [id number]       Admission Summary:   Theron Sterling Jr. is a 75 y.o. male with past medical history significant for dementia, Parkinson disease, and hypertension who initially presented at [facility] emergency room at [facility] on 2025 with abdominal pain.  Patient symptoms started a couple of days prior to ED arrival and got worse overnight.  The pain was diffuse in location, pressure-like, no radiation, no known aggravating or relieving factors and patient was unable to state the severity of the pain.  Patient was also not able to provide good history because of his underlying dementia.  He also reported diarrhea without nausea and vomiting. He has no record of a prior admission to this hospital. CT scan of the abdomen and pelvis done in the emergency room showed sigmoid volvulus.  Patient was transferred to the emergency room at [facility] for urgent GI and general surgery consult.  He was subsequently referred to the hospitalist service for admission.  Interval history / Subjective:   Patient seen and examined on rounds in conjunction with nursing. Patient sitting up in chair, comfortable surrounded by wife and daughter. Patient states he feels well, denies any pain with the exception of right over his incision with palpation. Aparicio catheter removed previously that AM, awaiting void. Discussed patient with Dr. Gamboa, would like the patient to stay one more day to continue working with PT/OT to give him best chance to discharge home instead of SNF and await urinary function. Drain still in place and making good output, general surgery to evaluate in

## 2025-06-26 NOTE — PLAN OF CARE
Problem: Physical Therapy - Adult  Goal: By Discharge: Performs mobility at highest level of function for planned discharge setting.  See evaluation for individualized goals.  Description: FUNCTIONAL STATUS PRIOR TO ADMISSION: Patient was independent and active without use of DME. 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    HOME SUPPORT PRIOR TO ADMISSION: The patient lived with his wife.    Physical Therapy Goals  Initiated 6/24/2025  1.  Patient will move from supine to sit and sit to supine and roll side to side in bed with modified independence within 7 day(s).    2.  Patient will perform sit to stand with modified independence within 7 day(s).  3.  Patient will transfer from bed to chair and chair to bed with modified independence using the least restrictive device within 7 day(s).  4.  Patient will ambulate with supervision/set-up for 100 feet with the least restrictive device within 7 day(s).   5.  Patient will ascend/descend 2 stairs with handrail(s) with supervision/set-up within 7 day(s).    Outcome: Progressing   PHYSICAL THERAPY TREATMENT    Patient: Theron Sterling JrBharat (75 y.o. male)  Date: 6/26/2025  Diagnosis: Hypokalemia [E87.6]  Hyponatremia [E87.1]  Sigmoid volvulus (HCC) [K56.2] Sigmoid volvulus (HCC)  Procedure(s) (LRB):  LAPAROSCOPIC SIGMOID RESECTION WITH RIGID PROCTOSCOPY (N/A) 3 Days Post-Op  Precautions: Restrictions/Precautions  Restrictions/Precautions: Fall Risk            ASSESSMENT:  Patient continues to benefit from skilled PT services and is progressing towards goals. Patient continues to demonstrate improvements in bed mobility. Pt's wife and daughter present to review education on mobility for home. Pt required use of bed rail and cues for log roll, family plans on purchasing bedrail for home. Pt progressed with ambulation continuing to require cues for increase step length with fair carryover. Pt performed stair

## 2025-06-26 NOTE — PROGRESS NOTES
Department of General Surgery - Adult  Surgical Service   Attending Progress Note      SUBJECTIVE: Patient notes decreased incisional pain; using Tramadol as needed; tolerating meals without emesis; he is passing flatus and working with OT/PT.    OBJECTIVE      Physical    VITALS:  BP (!) 141/76   Pulse 66   Temp 97.5 °F (36.4 °C) (Oral)   Resp 18   Wt 85.3 kg (188 lb 0.8 oz)   SpO2 93%   BMI 24.14 kg/m²   INTAKE/OUTPUT:    Intake/Output Summary (Last 24 hours) at 6/26/2025 0814  Last data filed at 6/26/2025 0411  Gross per 24 hour   Intake 530 ml   Output 1010 ml   Net -480 ml     ABDOMEN: Nondistended, soft.  Incisions dry and intact without signs of infection.  Serosanguineous drain fluid.  Appropriate incisional pain with palpation.    Data  CBC with Differential:    Lab Results   Component Value Date/Time    WBC 10.8 06/25/2025 02:18 AM    RBC 3.39 06/25/2025 02:18 AM    HGB 11.1 06/25/2025 02:18 AM    HCT 31.9 06/25/2025 02:18 AM     06/25/2025 02:18 AM    MCV 94.1 06/25/2025 02:18 AM    MCH 32.7 06/25/2025 02:18 AM    MCHC 34.8 06/25/2025 02:18 AM    RDW 13.6 06/25/2025 02:18 AM    NRBC 0.0 06/25/2025 02:18 AM    NRBC 0.00 06/25/2025 02:18 AM    LYMPHOPCT 3.1 06/25/2025 02:18 AM    MONOPCT 5.5 06/25/2025 02:18 AM    EOSPCT 0.1 06/25/2025 02:18 AM    BASOPCT 0.0 06/25/2025 02:18 AM    MONOSABS 0.59 06/25/2025 02:18 AM    LYMPHSABS 0.33 06/25/2025 02:18 AM    EOSABS 0.01 06/25/2025 02:18 AM    BASOSABS 0.00 06/25/2025 02:18 AM    DIFFTYPE SMEAR SCANNED 06/25/2025 02:18 AM     BMP:    Lab Results   Component Value Date/Time     06/25/2025 02:18 AM    K 4.1 06/25/2025 02:18 AM     06/25/2025 02:18 AM    CO2 29 06/25/2025 02:18 AM    BUN 8 06/25/2025 02:18 AM    CREATININE 1.08 06/25/2025 02:18 AM    CALCIUM 8.4 06/25/2025 02:18 AM    GFRAA >60 10/01/2019 03:47 PM    LABGLOM 72 06/25/2025 02:18 AM    GLUCOSE 140 06/25/2025 02:18 AM     Magnesium:    Lab Results   Component Value Date/Time

## 2025-06-27 VITALS
SYSTOLIC BLOOD PRESSURE: 148 MMHG | TEMPERATURE: 97.5 F | BODY MASS INDEX: 24.14 KG/M2 | RESPIRATION RATE: 16 BRPM | DIASTOLIC BLOOD PRESSURE: 82 MMHG | HEART RATE: 82 BPM | OXYGEN SATURATION: 95 % | WEIGHT: 188.05 LBS

## 2025-06-27 LAB
ANION GAP SERPL CALC-SCNC: 2 MMOL/L (ref 2–12)
BUN SERPL-MCNC: 10 MG/DL (ref 6–20)
BUN/CREAT SERPL: 11 (ref 12–20)
CALCIUM SERPL-MCNC: 8.1 MG/DL (ref 8.5–10.1)
CHLORIDE SERPL-SCNC: 105 MMOL/L (ref 97–108)
CO2 SERPL-SCNC: 29 MMOL/L (ref 21–32)
CREAT SERPL-MCNC: 0.95 MG/DL (ref 0.7–1.3)
ERYTHROCYTE [DISTWIDTH] IN BLOOD BY AUTOMATED COUNT: 13.2 % (ref 11.5–14.5)
GLUCOSE SERPL-MCNC: 87 MG/DL (ref 65–100)
HCT VFR BLD AUTO: 29.2 % (ref 36.6–50.3)
HGB BLD-MCNC: 9.7 G/DL (ref 12.1–17)
MCH RBC QN AUTO: 32.3 PG (ref 26–34)
MCHC RBC AUTO-ENTMCNC: 33.2 G/DL (ref 30–36.5)
MCV RBC AUTO: 97.3 FL (ref 80–99)
NRBC # BLD: 0 K/UL (ref 0–0.01)
NRBC BLD-RTO: 0 PER 100 WBC
PLATELET # BLD AUTO: 225 K/UL (ref 150–400)
PMV BLD AUTO: 10.2 FL (ref 8.9–12.9)
POTASSIUM SERPL-SCNC: 4 MMOL/L (ref 3.5–5.1)
RBC # BLD AUTO: 3 M/UL (ref 4.1–5.7)
SODIUM SERPL-SCNC: 136 MMOL/L (ref 136–145)
WBC # BLD AUTO: 6.6 K/UL (ref 4.1–11.1)

## 2025-06-27 PROCEDURE — 97530 THERAPEUTIC ACTIVITIES: CPT

## 2025-06-27 PROCEDURE — 2500000003 HC RX 250 WO HCPCS: Performed by: SURGERY

## 2025-06-27 PROCEDURE — 80048 BASIC METABOLIC PNL TOTAL CA: CPT

## 2025-06-27 PROCEDURE — 6370000000 HC RX 637 (ALT 250 FOR IP): Performed by: SURGERY

## 2025-06-27 PROCEDURE — 97116 GAIT TRAINING THERAPY: CPT

## 2025-06-27 PROCEDURE — 97535 SELF CARE MNGMENT TRAINING: CPT

## 2025-06-27 PROCEDURE — 85027 COMPLETE CBC AUTOMATED: CPT

## 2025-06-27 RX ORDER — TRAMADOL HYDROCHLORIDE 50 MG/1
50 TABLET ORAL EVERY 6 HOURS PRN
Qty: 5 TABLET | Refills: 0 | Status: SHIPPED | OUTPATIENT
Start: 2025-06-27 | End: 2025-06-30

## 2025-06-27 RX ADMIN — CARBIDOPA AND LEVODOPA 1 TABLET: 25; 100 TABLET ORAL at 10:26

## 2025-06-27 RX ADMIN — FINASTERIDE 5 MG: 5 TABLET, FILM COATED ORAL at 10:25

## 2025-06-27 RX ADMIN — SODIUM CHLORIDE, PRESERVATIVE FREE 10 ML: 5 INJECTION INTRAVENOUS at 10:36

## 2025-06-27 RX ADMIN — LOSARTAN POTASSIUM 100 MG: 50 TABLET, FILM COATED ORAL at 10:26

## 2025-06-27 RX ADMIN — SODIUM CHLORIDE, PRESERVATIVE FREE 10 ML: 5 INJECTION INTRAVENOUS at 10:28

## 2025-06-27 RX ADMIN — HYDROCHLOROTHIAZIDE 12.5 MG: 25 TABLET ORAL at 10:26

## 2025-06-27 RX ADMIN — ALVIMOPAN 12 MG: 12 CAPSULE ORAL at 10:26

## 2025-06-27 RX ADMIN — ACETAMINOPHEN 650 MG: 325 TABLET ORAL at 10:25

## 2025-06-27 ASSESSMENT — PAIN SCALES - GENERAL
PAINLEVEL_OUTOF10: 0
PAINLEVEL_OUTOF10: 0

## 2025-06-27 NOTE — DISCHARGE INSTRUCTIONS
Discharge Instructions       PATIENT ID: Theron Sterling Jr.  MRN: [id number]   YOB: 1950    DATE OF ADMISSION: 6/19/2025   DATE OF DISCHARGE: 6/27/2025    PRIMARY CARE PROVIDER: Kwan Last     ATTENDING PHYSICIAN: Vane Steve MD   DISCHARGING PROVIDER: BYRON Solis CNP    To contact this individual call [phone] and ask the  to page.   If unavailable ask to be transferred the Adult Hospitalist Department.    DISCHARGE DIAGNOSES sigmoid volvulus    CONSULTATIONS: [unfilled]    PROCEDURES/SURGERIES: Procedure(s):  LAPAROSCOPIC SIGMOID RESECTION WITH RIGID PROCTOSCOPY    PENDING TEST RESULTS:   At the time of discharge the following test results are still pending: none    FOLLOW UP APPOINTMENTS:   @[facility]     ADDITIONAL CARE RECOMMENDATIONS: none    DIET: low fiber diet  Oral Nutritional Supplements: Ensure High Proonce a day    ACTIVITY: activity as tolerated    WOUND CARE: keep clean and dry, may shower    EQUIPMENT needed: none      DISCHARGE MEDICATIONS:   See Medication Reconciliation Form    It is important that you take the medication exactly as they are prescribed.   Keep your medication in the bottles provided by the pharmacist and keep a list of the medication names, dosages, and times to be taken in your wallet.   Do not take other medications without consulting your doctor.       NOTIFY YOUR PHYSICIAN FOR ANY OF THE FOLLOWING:   Fever over 101 degrees for 24 hours.   Chest pain, shortness of breath, fever, chills, nausea, vomiting, diarrhea, change in mentation, falling, weakness, bleeding. Severe pain or pain not relieved by medications.  Or, any other signs or symptoms that you may have questions about.      DISPOSITION:   x Home With:   OT  PT  HH  RN       SNF/Inpatient Rehab/LTAC    Independent/assisted living    Hospice    Other:     CDMP Checked:   Yes y     PROBLEM LIST Updated:  Yes y       Signed:   BYRON Solis

## 2025-06-27 NOTE — DISCHARGE SUMMARY
Discharge Summary       PATIENT ID: Theron Sterling Jr.  MRN: [id number]   YOB: 1950    DATE OF ADMISSION: 6/19/2025  2:01 PM    DATE OF DISCHARGE: 06/27/25    PRIMARY CARE PROVIDER: Kwan Last MD     ATTENDING PHYSICIAN: Dr. Vane Steve  DISCHARGING PROVIDER: BYRON Solis CNP    To contact this individual call [phone] and ask the  to page.  If unavailable ask to be transferred the Adult Hospitalist Department.    CONSULTATIONS: IP CONSULT TO GI  IP CONSULT TO HOSPITALIST  IP CONSULT TO DIETITIAN  IP CONSULT TO CASE MANAGEMENT  IP CONSULT TO CASE MANAGEMENT  IP CONSULT TO CASE MANAGEMENT    PROCEDURES/SURGERIES: Procedure(s):  LAPAROSCOPIC SIGMOID RESECTION WITH RIGID PROCTOSCOPY     ADMITTING DIAGNOSES & HOSPITAL COURSE:   Theron Sterling Jr. is a 75 y.o. male with past medical history significant for dementia, Parkinson disease, and hypertension who initially presented at [facility] emergency room at [facility] on 6/19/2025 with abdominal pain.  Patient symptoms started a couple of days prior to ED arrival and got worse overnight.  The pain was diffuse in location, pressure-like, no radiation, no known aggravating or relieving factors and patient was unable to state the severity of the pain.  Patient was also not able to provide good history because of his underlying dementia.  He also reported diarrhea without nausea and vomiting. He has no record of a prior admission to this hospital. CT scan of the abdomen and pelvis done in the emergency room showed sigmoid volvulus.  Patient was transferred to the emergency room at [facility] for urgent GI and general surgery consult.  He was subsequently referred to the hospitalist service for admission.     Sigmoid Volvulus s/p endoscopic reduction 6/19, colon resection 6/23  -POD3 Colon resection   -Reduced endoscopically with GI (Dr. Florian) on (6/19)   -S/p colon resection with Dr Gamboa

## 2025-06-27 NOTE — PLAN OF CARE
deficits  Initiation: Requires cues for some  Sequencing: Requires cues for some    Functional Mobility Training:  Bed Mobility:  Bed Mobility Training  Bed Mobility Training: No  Interventions: Tactile cues;Verbal cues;Demonstration  Transfers:  Transfer Training  Transfer Training: Yes  Overall Level of Assistance: Contact guard assistance  Interventions: Safety awareness training;Tactile cues;Verbal cues  Sit to Stand: Contact guard assistance (from low surface)  Stand to Sit: Contact guard assistance  Toilet Transfer: Stand by assistance (use of grab bar)  Balance:  Balance  Sitting: Intact  Standing: Impaired  Standing - Static: Constant support;Good  Standing - Dynamic: Constant support;Fair   Ambulation/Gait Training:     Gait  Gait Training: Yes  Overall Level of Assistance: Contact guard assistance (with occasional min assist in tight space)  Distance (ft): 40 Feet (20 feet X 2)  Assistive Device: Gait belt;Walker, rolling  Interventions: Verbal cues;Safety awareness training;Manual cues  Base of Support: Narrowed  Speed/Miladis: Slow;Shuffled;Pace decreased (< 100 feet/min)  Step Length: Left shortened;Right shortened  Gait Abnormalities: Decreased step clearance;Shuffling gait        Neuro Re-Education:                    Pain Rating:  None rated   Pain Intervention(s):       Activity Tolerance:   Good    After treatment:   Patient left in no apparent distress sitting up in chair, Call bell within reach, Bed/ chair alarm activated, and Caregiver / family present      COMMUNICATION/EDUCATION:   The patient's plan of care was discussed with: registered nurse    Patient Education  Education Given To: Patient;Family  Education Provided: Transfer Training;Mobility Training;Family Education;Fall Prevention Strategies  Education Provided Comments: use of incentive spirometer and how to don gait belt for home use. Also educated pt and family on activity pacing.  Education Method: Verbal;Demonstration  Barriers to

## 2025-06-27 NOTE — CARE COORDINATION
Transition of Care Plan:    RUR: 10%  Prior Level of Functioning: independent  DESTINEE: 6/27/25  Follow up appointments:   DME needed: has  Transportation at discharge: wife  IM/IMM Medicare/ letter given:   Caregiver Contact: wife Cassidy gaytan [phone]; c [phone]  Discharge Caregiver contacted prior to discharge? yes  Care Conference needed? no  Barriers to discharge: no home health agency accepted  Disposition: home with family and prescription for outpatient PT and OT    Received hand off from previous CM.     Responses from HH agencies.  [facility] - Not in network with that [employer] PPO.  [facility] - Per service , they are not contracted with that payor.  [facility] - Payor is at capacity.   [facility] - No staffing.  [facility] - Payor at capacity.  [facility] - Not contracted with payor.  [facility] - (CM sent a new message in [facility])  [facility] - (Awaiting agency to check insurance response.)    9:15 AM Called Rodrigo Levin / [facility]. Spoke with George, she will ask Jignesh in intake call back. 11:05 called [facility] again. Spoke with George. She said the director Quynh said they are declining the patient due to staffing. Asked them to update this in [facility].     10:30 AM Vignesh Story ([phone]) confirmed they received the referral yesterday and the insurance is in que to be ran. He will notify CM once they have a response.     11:05 Called dulce Alba ([phone]) for [facility] ([phone]). Asked that they please check the referral and respond.     12:30 PM Met with patient and family at 11:15 to explain all of the above. Plan will be outpatient PT and OT if no response from the last 2 agencies. Patient will plan to go the the outpatient PT he worked with in the past. Met with patient and family again to explain still no accepting agency. Gave wife the prescription the NP wrote.     Updated RN.    JAY WILLINGHAM,

## 2025-06-27 NOTE — PROGRESS NOTES
Department of General Surgery - Adult  Surgical Service   Attending Progress Note      SUBJECTIVE: Patient tolerating meals without emesis; he is passing flatus and working with OT/PT; out of bed in chair.    OBJECTIVE      Physical    VITALS:  BP (!) 148/82   Pulse 82   Temp 97.5 °F (36.4 °C)   Resp 16   Wt 85.3 kg (188 lb 0.8 oz)   SpO2 95%   BMI 24.14 kg/m²   INTAKE/OUTPUT:    Intake/Output Summary (Last 24 hours) at 6/27/2025 1127  Last data filed at 6/27/2025 0900  Gross per 24 hour   Intake 240 ml   Output 1684 ml   Net -1444 ml     ABDOMEN: Nondistended, soft.  Incisions dry and intact without signs of infection.  Serous drain fluid.  Appropriate incisional pain with palpation.    Data  CBC with Differential:    Lab Results   Component Value Date/Time    WBC 6.6 06/27/2025 02:11 AM    RBC 3.00 06/27/2025 02:11 AM    HGB 9.7 06/27/2025 02:11 AM    HCT 29.2 06/27/2025 02:11 AM     06/27/2025 02:11 AM    MCV 97.3 06/27/2025 02:11 AM    MCH 32.3 06/27/2025 02:11 AM    MCHC 33.2 06/27/2025 02:11 AM    RDW 13.2 06/27/2025 02:11 AM    NRBC 0.0 06/27/2025 02:11 AM    NRBC 0.00 06/27/2025 02:11 AM    LYMPHOPCT 9.1 06/26/2025 05:25 AM    MONOPCT 6.3 06/26/2025 05:25 AM    EOSPCT 3.7 06/26/2025 05:25 AM    BASOPCT 0.3 06/26/2025 05:25 AM    MONOSABS 0.55 06/26/2025 05:25 AM    LYMPHSABS 0.79 06/26/2025 05:25 AM    EOSABS 0.32 06/26/2025 05:25 AM    BASOSABS 0.03 06/26/2025 05:25 AM    DIFFTYPE SMEAR SCANNED 06/26/2025 05:25 AM     BMP:    Lab Results   Component Value Date/Time     06/27/2025 02:11 AM    K 4.0 06/27/2025 02:11 AM     06/27/2025 02:11 AM    CO2 29 06/27/2025 02:11 AM    BUN 10 06/27/2025 02:11 AM    CREATININE 0.95 06/27/2025 02:11 AM    CALCIUM 8.1 06/27/2025 02:11 AM    GFRAA >60 10/01/2019 03:47 PM    LABGLOM 83 06/27/2025 02:11 AM    GLUCOSE 87 06/27/2025 02:11 AM     Magnesium:    Lab Results   Component Value Date/Time    MG 1.8 06/24/2025 03:34 AM     Phosphorus:    Lab

## 2025-06-27 NOTE — PLAN OF CARE
Problem: Occupational Therapy - Adult  Goal: By Discharge: Performs self-care activities at highest level of function for planned discharge setting.  See evaluation for individualized goals.  Description: FUNCTIONAL STATUS PRIOR TO ADMISSION:  Pt was independent with ADLs and IADLs prior to admission. Pt works full-time.    HOME SUPPORT: Patient lived with spouse but didn't require assistance.    Occupational Therapy Goals:  Initiated 6/25/2025  1.  Patient will perform grooming standing at sink with Stand by Assist within 7 day(s).  2.  Patient will perform lower body dressing with Minimal Assist within 7 day(s).  3.  Patient will perform bathing with Supervision within 7 day(s).  4.  Patient will perform toilet transfers with Supervision  within 7 day(s).  5.  Patient will perform all aspects of toileting with Minimal Assist within 7 day(s).  6.  Patient will participate in upper extremity therapeutic exercise/activities with Set-up for 10 minutes within 7 day(s).    7.  Patient will utilize energy conservation techniques during functional activities with verbal cues within 7 day(s).   Outcome: Progressing   OCCUPATIONAL THERAPY TREATMENT  Patient: Theron Sterling JrBharat (75 y.o. male)  Date: 6/27/2025  Primary Diagnosis: Hypokalemia [E87.6]  Hyponatremia [E87.1]  Sigmoid volvulus (HCC) [K56.2]  Procedure(s) (LRB):  LAPAROSCOPIC SIGMOID RESECTION WITH RIGID PROCTOSCOPY (N/A) 4 Days Post-Op   Precautions: Fall Risk                Chart, occupational therapy assessment, plan of care, and goals were reviewed.    ASSESSMENT  Patient continues to benefit from skilled OT services and is progressing towards goals.   Pt received in bathroom after having his first BM since surgery.   Pt completed hygiene after set up.   Pt completed bathing tasks from seated position on toilet requiring assistance reaching back and lower legs/ feet.  Pt completed upper body dressing requiring intermittent assistance for problem solving.   Pt

## 2025-07-01 ENCOUNTER — OFFICE VISIT (OUTPATIENT)
Age: 75
End: 2025-07-01

## 2025-07-01 VITALS
HEART RATE: 81 BPM | BODY MASS INDEX: 23.41 KG/M2 | TEMPERATURE: 98.4 F | HEIGHT: 74 IN | WEIGHT: 182.4 LBS | OXYGEN SATURATION: 97 % | DIASTOLIC BLOOD PRESSURE: 78 MMHG | RESPIRATION RATE: 16 BRPM | SYSTOLIC BLOOD PRESSURE: 125 MMHG

## 2025-07-01 DIAGNOSIS — Z09 POSTOP CHECK: Primary | ICD-10-CM

## 2025-07-01 PROCEDURE — 99024 POSTOP FOLLOW-UP VISIT: CPT | Performed by: NURSE PRACTITIONER

## 2025-07-01 ASSESSMENT — PATIENT HEALTH QUESTIONNAIRE - PHQ9
SUM OF ALL RESPONSES TO PHQ QUESTIONS 1-9: 0
SUM OF ALL RESPONSES TO PHQ QUESTIONS 1-9: 0
2. FEELING DOWN, DEPRESSED OR HOPELESS: NOT AT ALL
1. LITTLE INTEREST OR PLEASURE IN DOING THINGS: NOT AT ALL
SUM OF ALL RESPONSES TO PHQ QUESTIONS 1-9: 0
SUM OF ALL RESPONSES TO PHQ QUESTIONS 1-9: 0

## 2025-07-01 NOTE — PROGRESS NOTES
Subjective:      Theron Sterling Jr. is a 75 y.o. male presents for postop care  11 days status post  laparoscopic sigmoid resection.   Appetite is good. Eating a regular diet without difficulty.   Bowel movements are regular.  He is voiding hourly.   The patient is not having any pain..        Mr. Sterling has a reminder for a \"due or due soon\" health maintenance. I have asked that he contact his primary care provider for follow-up on this health maintenance.      Objective:     /78   Pulse 81   Temp 98.4 °F (36.9 °C) (Oral)   Resp 16   Ht 1.88 m (6' 2\")   Wt 82.7 kg (182 lb 6.4 oz)   SpO2 97%   BMI 23.42 kg/m²     General:  alert, cooperative   Abdomen: soft, non-tender   Incision:   healing well, no drainage, no erythema, staples intact, staples removed and steri strips placed, no dehiscence, incision well approximated     Assessment:     Doing well postoperatively.    Plan:     Discussed urinary symptoms with Dr. Gamboa. Most likely diuresis from IV fluids in the hospital. Continue to monitor and if continues follow up with PCP  OT coming tomorrow and still waiting on PT.   Continue low fiber diet.   Follow up as needed  May wash incision.     BYRON Melissa - NP

## 2025-07-01 NOTE — PROGRESS NOTES
Identified patient with two patient identifiers (name and ). Reviewed chart in preparation for visit and have obtained necessary documentation.    Theron Sterling Jr. is a 75 y.o. male  Chief Complaint   Patient presents with    Post-Op Check     Laparoscopic sigmoid resection     /78   Pulse 81   Temp 98.4 °F (36.9 °C) (Oral)   Resp 16   Ht 1.88 m (6' 2\")   Wt 82.7 kg (182 lb 6.4 oz)   SpO2 97%   BMI 23.42 kg/m²     1. Have you been to the ER, urgent care clinic since your last visit?  Hospitalized since your last visit?no    2. Have you seen or consulted any other health care providers outside of the [facility] since your last visit?  Include any pap smears or colon screening. yes -

## 2025-07-08 DIAGNOSIS — F02.A4 MILD DEMENTIA DUE TO PARKINSON'S DISEASE, WITH ANXIETY (HCC): ICD-10-CM

## 2025-07-08 DIAGNOSIS — G20.A1 PARKINSON'S DISEASE WITHOUT DYSKINESIA, UNSPECIFIED WHETHER MANIFESTATIONS FLUCTUATE (HCC): Primary | ICD-10-CM

## 2025-07-08 DIAGNOSIS — G20.A1 MILD DEMENTIA DUE TO PARKINSON'S DISEASE, WITH ANXIETY (HCC): ICD-10-CM

## 2025-07-09 ENCOUNTER — CLINICAL DOCUMENTATION (OUTPATIENT)
Age: 75
End: 2025-07-09

## 2025-07-09 NOTE — PROGRESS NOTES
Faxed driving evaluation  referral and last office note to [facility] at [phone] and received fax confirmation. Placed confirmation in fast scan.     Sent pt a my chart message.

## 2025-07-10 ENCOUNTER — OFFICE VISIT (OUTPATIENT)
Age: 75
End: 2025-07-10

## 2025-07-10 VITALS
BODY MASS INDEX: 23.02 KG/M2 | SYSTOLIC BLOOD PRESSURE: 105 MMHG | DIASTOLIC BLOOD PRESSURE: 71 MMHG | WEIGHT: 179.4 LBS | HEART RATE: 79 BPM | RESPIRATION RATE: 17 BRPM | TEMPERATURE: 98 F | HEIGHT: 74 IN | OXYGEN SATURATION: 97 %

## 2025-07-10 DIAGNOSIS — Z09 POSTOP CHECK: Primary | ICD-10-CM

## 2025-07-10 PROCEDURE — 99024 POSTOP FOLLOW-UP VISIT: CPT | Performed by: NURSE PRACTITIONER

## 2025-07-10 RX ORDER — PSEUDOEPHEDRINE HCL 30 MG
100 TABLET ORAL AS NEEDED
Qty: 60 CAPSULE | Refills: 0 | Status: SHIPPED | OUTPATIENT
Start: 2025-07-10

## 2025-07-10 ASSESSMENT — PATIENT HEALTH QUESTIONNAIRE - PHQ9
2. FEELING DOWN, DEPRESSED OR HOPELESS: NOT AT ALL
SUM OF ALL RESPONSES TO PHQ QUESTIONS 1-9: 0
1. LITTLE INTEREST OR PLEASURE IN DOING THINGS: NOT AT ALL
SUM OF ALL RESPONSES TO PHQ QUESTIONS 1-9: 0

## 2025-07-10 NOTE — PROGRESS NOTES
Subjective:      Theron Sterling Jr. is a 75 y.o. male presents for postop care 2 weeks status post LAPAROSCOPIC SIGMOID RESECTION WITH RIGID PROCTOSCOPY   Appetite is fair. Eating a low fiber  diet without difficulty.   Had a BM this morning. It had been 5 days. No bowel regimen.   The patient is voiding without difficulty.  The patient is not having any pain..  He is here today for staple removal       Mr. Sterling has a reminder for a \"due or due soon\" health maintenance. I have asked that he contact his primary care provider for follow-up on this health maintenance.      Objective:     /71 (BP Site: Left Upper Arm, Patient Position: Sitting, BP Cuff Size: Large Adult)   Pulse 79   Temp 98 °F (36.7 °C) (Oral)   Resp 17   Ht 1.88 m (6' 2\")   Wt 81.4 kg (179 lb 6.4 oz)   SpO2 97%   BMI 23.03 kg/m²     General:  alert, cooperative, no distress   Abdomen: soft, non-tender   Incision:   healing well, remaining staples from lap sites removed, no dehiscence, incision well approximated     Assessment:     Doing well postoperatively.    Plan:     Prescription written for Colace to help with constipation  Follow-up as needed  May increase fiber in diet slowly    BYRON Melissa - NP

## 2025-07-10 NOTE — PROGRESS NOTES
Identified patient with two patient identifiers (name and ). Reviewed chart in preparation for visit and have obtained necessary documentation.    Theron Sterling Jr. is a 75 y.o. male  Chief Complaint   Patient presents with    Post-Op Check    Suture / Staple Removal     /71 (BP Site: Left Upper Arm, Patient Position: Sitting, BP Cuff Size: Large Adult)   Pulse 79   Temp 98 °F (36.7 °C) (Oral)   Resp 17   Ht 1.88 m (6' 2\")   Wt 81.4 kg (179 lb 6.4 oz)   SpO2 97%   BMI 23.03 kg/m²     1. Have you been to the ER, urgent care clinic since your last visit?  Hospitalized since your last visit?no    2. Have you seen or consulted any other health care providers outside of the [facility] since your last visit?  Include any pap smears or colon screening. no

## 2025-07-11 ENCOUNTER — TELEPHONE (OUTPATIENT)
Age: 75
End: 2025-07-11

## 2025-07-11 ENCOUNTER — CLINICAL DOCUMENTATION (OUTPATIENT)
Age: 75
End: 2025-07-11

## 2025-07-11 NOTE — TELEPHONE ENCOUNTER
Called and LVM to pt @ 4:16pm to confirm RTW date for disability claim form.    Sent [employer] In[employer] message to pt.

## 2025-07-11 NOTE — PROGRESS NOTES
Standard paperwork completed with release 6 weeks post hospital discharge.  Forms faxed confirmation received.

## 2025-07-18 ENCOUNTER — CLINICAL DOCUMENTATION (OUTPATIENT)
Age: 75
End: 2025-07-18

## 2025-08-25 ENCOUNTER — CLINICAL DOCUMENTATION (OUTPATIENT)
Age: 75
End: 2025-08-25

## (undated) DEVICE — STERILE-Z MAYO STAND COVERS CLEAR POLYETHYLENE STERILE UNIVERSAL FIT 20 PER CASE: Brand: STERILE-Z

## (undated) DEVICE — SOLUTION IV 1000 ML 0.9 NACL INJ USP EXCEL PLAS CONTAINER

## (undated) DEVICE — TROCAR: Brand: KII® SLEEVE

## (undated) DEVICE — DRAPE,ROBOTICS,STERILE: Brand: MEDLINE

## (undated) DEVICE — SUTURE PDS II SZ 0 L60IN ABSRB VLT L48MM CTX 1/2 CIR Z990G

## (undated) DEVICE — PENCIL SMK EVAC 10 FT BLADE ELECTRD ROCKER FOR TELSCP

## (undated) DEVICE — CLICKLINE SCISSORS INSERT: Brand: CLICKLINE

## (undated) DEVICE — COVER LT HNDL PLAS RIG 1 PER PK

## (undated) DEVICE — SYSTEM EVAC SMOKE LAPARSCOPIC

## (undated) DEVICE — RELOAD STPL L60MM H1-2.6MM MESENTERY THN TISS WHT 6 ROW

## (undated) DEVICE — DEVICE SUT FOR TRCR SITE INCIS ENDO CLOSE

## (undated) DEVICE — 40580 - THE PINK PAD - ADVANCED TRENDELENBURG POSITIONING KIT: Brand: 40580 - THE PINK PAD - ADVANCED TRENDELENBURG POSITIONING KIT

## (undated) DEVICE — HYPODERMIC SAFETY NEEDLE: Brand: MAGELLAN

## (undated) DEVICE — SEALER ONE-STEP 37CM LIGASURE MARYLAND XP

## (undated) DEVICE — ELECTRODE PT RET AD L9FT HI MOIST COND ADH HYDRGEL CORDED

## (undated) DEVICE — RESERVOIR,SUCTION,100CC,SILICONE: Brand: MEDLINE

## (undated) DEVICE — BLADE,CARBON-STEEL,11,STRL,DISPOSABLE,TB: Brand: MEDLINE

## (undated) DEVICE — ACCESS PLATFORM FOR MINIMALLY INVASIVE SURGERY.: Brand: GELPORT® LAPAROSCOPIC  SYSTEM

## (undated) DEVICE — SNARE ENDOSCP POLYP MED 2.4 MM 240 CM 27 MM 2.8 MM SHT SENS

## (undated) DEVICE — SHEET,DRAPE,UNDERBUTTOCK,GRAD POUCH,PORT: Brand: MEDLINE

## (undated) DEVICE — 1LYRTR 16FR10ML 100%SILI SNAP: Brand: MEDLINE INDUSTRIES, INC.

## (undated) DEVICE — GOWN,SIRUS,FABRNF,XL,20/CS: Brand: MEDLINE

## (undated) DEVICE — TBG INSUFFLATION LUER LOCK: Brand: MEDLINE INDUSTRIES, INC.

## (undated) DEVICE — DRAIN SURG 19FR 0.25IN SIL RND W/ TRCR INDIC DOT RADPQ FULL

## (undated) DEVICE — GLOVE SURG SZ 75 L1212IN FNGR THK138MIL BRN LTX FREE

## (undated) DEVICE — SEALER ONE-STEP 44CM LIGASURE MARYLAND XP

## (undated) DEVICE — SUPPLEMENT DIGESTIVE H2O SOL GI-EASE

## (undated) DEVICE — COLON CLOSING PACK: Brand: MEDLINE INDUSTRIES, INC.

## (undated) DEVICE — TROCAR: Brand: KII® OPTICAL ACCESS SYSTEM

## (undated) DEVICE — BASIN ST MAJOR-NO CAUTERY: Brand: MEDLINE INDUSTRIES, INC.

## (undated) DEVICE — LAPAROSCOPIC TROCAR SLEEVE/SINGLE USE: Brand: KII® OPTICAL ACCESS SYSTEM

## (undated) DEVICE — 4-PORT MANIFOLD: Brand: NEPTUNE 2

## (undated) DEVICE — 14FR COLON DECOMPRESSION SET: Brand: COOK

## (undated) DEVICE — DRAPE,UTILTY,TAPE,15X26, 4EA/PK: Brand: MEDLINE

## (undated) DEVICE — Device

## (undated) DEVICE — APPLICATOR MEDICATED 26 CC SOLUTION HI LT ORNG CHLORAPREP

## (undated) DEVICE — X-RAY DETECTABLE SPONGES,16 PLY: Brand: VISTEC

## (undated) DEVICE — GRASPER ENDOSCP TIP L10MM ANVIL ROT RATCH HNDL DISP

## (undated) DEVICE — STAPLER INT DIA29MM CLS STPL H1.5-2.2MM OPN LEG L5.2MM 26

## (undated) DEVICE — SUTURE ETHILON SZ 2-0 L18IN NONABSORBABLE BLK L26MM FS 3/8 664G

## (undated) DEVICE — GARMENT,MEDLINE,DVT,INT,CALF,MED, GEN2: Brand: MEDLINE

## (undated) DEVICE — DRAINBAG,ANTI-REFLUX TOWER,L/F,2000ML,LL: Brand: MEDLINE

## (undated) DEVICE — STAPLER 60MM POWERED ECHELON 3000  SHORT 340MM

## (undated) DEVICE — PACK,BASIC,SIRUS,V: Brand: MEDLINE

## (undated) DEVICE — SOLUTION ANTIFOG VIS SYS CLEARIFY LAPSCP